# Patient Record
Sex: FEMALE | Race: WHITE | URBAN - METROPOLITAN AREA
[De-identification: names, ages, dates, MRNs, and addresses within clinical notes are randomized per-mention and may not be internally consistent; named-entity substitution may affect disease eponyms.]

---

## 2017-01-14 LAB
ALBUMIN SERPL-MCNC: 4.6 G/DL (ref 3.5–5.5)
ALBUMIN/GLOB SERPL: 1.9 {RATIO} (ref 1.1–2.5)
ALP SERPL-CCNC: 76 IU/L (ref 39–117)
ALT SERPL-CCNC: 14 IU/L (ref 0–32)
AST SERPL-CCNC: 19 IU/L (ref 0–40)
BILIRUB SERPL-MCNC: 0.3 MG/DL (ref 0–1.2)
BUN SERPL-MCNC: 21 MG/DL (ref 6–24)
BUN/CREAT SERPL: 24 (ref 9–23)
CALCIUM SERPL-MCNC: 9.5 MG/DL (ref 8.7–10.2)
CHLORIDE SERPL-SCNC: 101 MMOL/L (ref 96–106)
CHOLEST SERPL-MCNC: 266 MG/DL (ref 100–199)
CO2 SERPL-SCNC: 23 MMOL/L (ref 18–29)
CREAT SERPL-MCNC: 0.86 MG/DL (ref 0.57–1)
EST. AVERAGE GLUCOSE BLD GHB EST-MCNC: 128 MG/DL
GLOBULIN SER CALC-MCNC: 2.4 G/DL (ref 1.5–4.5)
GLUCOSE SERPL-MCNC: 85 MG/DL (ref 65–99)
HBA1C MFR BLD: 6.1 % (ref 4.8–5.6)
HDLC SERPL-MCNC: 62 MG/DL
LDLC SERPL CALC-MCNC: 172 MG/DL (ref 0–99)
POTASSIUM SERPL-SCNC: 5 MMOL/L (ref 3.5–5.2)
PROT SERPL-MCNC: 7 G/DL (ref 6–8.5)
SODIUM SERPL-SCNC: 144 MMOL/L (ref 134–144)
TRIGL SERPL-MCNC: 160 MG/DL (ref 0–149)
VLDLC SERPL CALC-MCNC: 32 MG/DL (ref 5–40)

## 2017-01-17 ENCOUNTER — OFFICE VISIT (OUTPATIENT)
Dept: INTERNAL MEDICINE CLINIC | Age: 62
End: 2017-01-17

## 2017-01-17 VITALS
TEMPERATURE: 97.9 F | SYSTOLIC BLOOD PRESSURE: 100 MMHG | DIASTOLIC BLOOD PRESSURE: 58 MMHG | HEART RATE: 74 BPM | OXYGEN SATURATION: 97 % | RESPIRATION RATE: 16 BRPM | WEIGHT: 171.8 LBS | HEIGHT: 66 IN | BODY MASS INDEX: 27.61 KG/M2

## 2017-01-17 DIAGNOSIS — R73.03 PREDIABETES: ICD-10-CM

## 2017-01-17 DIAGNOSIS — G47.09 OTHER INSOMNIA: Primary | ICD-10-CM

## 2017-01-17 DIAGNOSIS — E78.49 OTHER HYPERLIPIDEMIA: ICD-10-CM

## 2017-01-17 DIAGNOSIS — F32.9 REACTIVE DEPRESSION: ICD-10-CM

## 2017-01-17 DIAGNOSIS — E03.9 ACQUIRED HYPOTHYROIDISM: ICD-10-CM

## 2017-01-17 RX ORDER — TRAZODONE HYDROCHLORIDE 50 MG/1
TABLET ORAL
Qty: 30 TAB | Refills: 1 | Status: SHIPPED | OUTPATIENT
Start: 2017-01-17 | End: 2017-08-15

## 2017-01-17 RX ORDER — LEVOFLOXACIN 500 MG/1
TABLET, FILM COATED ORAL
Refills: 0 | COMMUNITY
Start: 2017-01-06 | End: 2017-08-15

## 2017-01-17 NOTE — PATIENT INSTRUCTIONS
It was a pleasure to see you! As discussed:    Lab review  -Your cholesterol has improved. -Your vitamin Dis  low--> I order high dose vitamin D which you will take weekly for 8 weeks. After 8 weeks start an over the counter vitamin D supplement 800-1000IU/ day. -Your cholesterol is elevated but fortunately based on your risk factors a statin is not indicated. Continue to work on optimizing your weight (goal lose at least 5% body weight), healthy eating and cardiovascular disease (Recommendation: reduce carbs to 150-200g/ day and the Mediterranean Diet). The remainder of your labs were normal. Some labs that may have been tested and their explanation are:  Your electrolytes, kidney & liver function (Metabolic Panel)   Anemia, blood cells (CBC)  Thyroid (TSH + T4, T3)  Hormones (prolactin, vitamin D )   Diabetes (Hemoglobin A1c)          Recovering From Depression: Care Instructions  Your Care Instructions  Taking good care of yourself is important as you recover from depression. In time, your symptoms will fade as your treatment takes hold. Do not give up. Instead, focus your energy on getting better. Your mood will improve. It just takes some time. Focus on things that can help you feel better, such as being with friends and family, eating well, and getting enough rest. But take things slowly. Do not do too much too soon. You will begin to feel better gradually. Follow-up care is a key part of your treatment and safety. Be sure to make and go to all appointments, and call your doctor if you are having problems. It's also a good idea to know your test results and keep a list of the medicines you take. How can you care for yourself at home? Be realistic  · If you have a large task to do, break it up into smaller steps you can handle, and just do what you can. · You may want to put off important decisions until your depression has lifted.  If you have plans that will have a major impact on your life, such as marriage, divorce, or a job change, try to wait a bit. Talk it over with friends and loved ones who can help you look at the overall picture first.  · Reaching out to people for help is important. Do not isolate yourself. Let your family and friends help you. Find someone you can trust and confide in, and talk to that person. · Be patient, and be kind to yourself. Remember that depression is not your fault and is not something you can overcome with willpower alone. Treatment is necessary for depression, just like for any other illness. Feeling better takes time, and your mood will improve little by little. Stay active  · Stay busy and get outside. Take a walk, or try some other light exercise. · Talk with your doctor about an exercise program. Exercise can help with mild depression. · Go to a movie or concert. Take part in a Jainism activity or other social gathering. Go to a ball game. · Ask a friend to have dinner with you. Take care of yourself  · Eat a balanced diet with plenty of fresh fruits and vegetables, whole grains, and lean protein. If you have lost your appetite, eat small snacks rather than large meals. · Avoid drinking alcohol or using illegal drugs. Do not take medicines that have not been prescribed for you. They may interfere with medicines you may be taking for depression, or they may make your depression worse. · Take your medicines exactly as they are prescribed. You may start to feel better within 1 to 3 weeks of taking antidepressant medicine. But it can take as many as 6 to 8 weeks to see more improvement. If you have questions or concerns about your medicines, or if you do not notice any improvement by 3 weeks, talk to your doctor. · If you have any side effects from your medicine, tell your doctor. Antidepressants can make you feel tired, dizzy, or nervous. Some people have dry mouth, constipation, headaches, sexual problems, or diarrhea.  Many of these side effects are mild and will go away on their own after you have been taking the medicine for a few weeks. Some may last longer. Talk to your doctor if side effects are bothering you too much. You might be able to try a different medicine. · Get enough sleep. If you have problems sleeping:  ¨ Go to bed at the same time every night, and get up at the same time every morning. ¨ Keep your bedroom dark and quiet. ¨ Do not exercise after 5:00 p.m. ¨ Avoid drinks with caffeine after 5:00 p.m. · Avoid sleeping pills unless they are prescribed by the doctor treating your depression. Sleeping pills may make you groggy during the day, and they may interact with other medicine you are taking. · If you have any other illnesses, such as diabetes, heart disease, or high blood pressure, make sure to continue with your treatment. Tell your doctor about all of the medicines you take, including those with or without a prescription. · Keep the numbers for these national suicide hotlines: 3-424-200-TALK (5-713.382.2493) and 3-962-PKQUIBQ (8-993.530.2130). If you or someone you know talks about suicide or feeling hopeless, get help right away. When should you call for help? Call 911 anytime you think you may need emergency care. For example, call if:  · You feel like hurting yourself or someone else. · Someone you know has depression and is about to attempt or is attempting suicide. Call your doctor now or seek immediate medical care if:  · You hear voices. · Someone you know has depression and:  ¨ Starts to give away his or her possessions. ¨ Uses illegal drugs or drinks alcohol heavily. ¨ Talks or writes about death, including writing suicide notes or talking about guns, knives, or pills. ¨ Starts to spend a lot of time alone. ¨ Acts very aggressively or suddenly appears calm. Watch closely for changes in your health, and be sure to contact your doctor if:  · You do not get better as expected. Where can you learn more?   Go to http://bello-alejandro.info/. Enter T779 in the search box to learn more about \"Recovering From Depression: Care Instructions. \"  Current as of: July 26, 2016  Content Version: 11.1  © 2070-0081 [employer]. Care instructions adapted under license by [employer] (which disclaims liability or warranty for this information). If you have questions about a medical condition or this instruction, always ask your healthcare professional. [address] any warranty or liability for your use of this information. Grief (Actual/Anticipated): Care Instructions  Your Care Instructions  Grief is your emotional reaction to a major loss. The words \"sorrow\" and \"heartache\" often are used to describe feelings of grief. You feel grief when you lose a beloved person, pet, place, or thing. It is also natural to feel grief when you lose a valued way of life, such as a job, marriage, or good health. You may begin to grieve before a loss occurs. You may grieve for a loved one who is sick and dying. Children and adults often feel the pain of loss before a big move or divorce. This type of grief helps you get ready for a loss. Grief is different for each person. There is no \"normal\" or \"expected\" period of time for grieving. Some people adjust to their loss within a couple of months. Others may take 2 years or longer, especially if their lives were changed a lot or if the loss was sudden and shocking. Grieving can cause problems such as headaches, loss of appetite, and trouble with thinking or sleeping. You may withdraw from friends and family and behave in ways that are unusual for you. Grief may cause you to question your beliefs and views about life. Grief is natural and does not require medical treatment. But if you have trouble sleeping, it may help to take sleeping pills for a short time.  It may help to talk with people who have been through or are going through similar losses. You may also want to talk to a counselor about your feelings. Talking about your loss, sharing your cares and concerns, and getting support from others are important parts of healthy grieving. Follow-up care is a key part of your treatment and safety. Be sure to make and go to all appointments, and call your doctor if you are having problems. Its also a good idea to know your test results and keep a list of the medicines you take. How can you care for yourself at home? · Get enough sleep. Your mind helps make sense of your life while you sleep. Missing sleep can lead to illness and make it harder for you to deal with your grief. · Eat healthy foods. Try to avoid eating only foods that give you comfort. Ask someone to join you for a meal if you do not like eating alone. Consider taking a multivitamin every day. · Get some exercise every day. Even a walk can help you deal with your grief. Other exercises, such as yoga, can also help you manage stress. · Comfort yourself. Take time to look at photos or use special items that make you feel better. · Stay involved in your life. Do not withdraw from the activities you enjoy. People you know at work, Evangelical, clubs, or other groups can help you get through your period of grief. · Think about joining a support group to help you deal with your grief. There are many support groups to help people recover from grief. When should you call for help? Be sure to contact your doctor if:  · You feel that life is meaningless, or you think about killing yourself. · A grieving person you know talks about hurting himself or herself. · You have any of the following problems that last for 2 or more weeks:  ¨ You feel sad a lot or cry all the time. ¨ You have trouble sleeping, or you sleep too much. ¨ You find it hard to concentrate, make decisions, or remember things. ¨ You change how you normally eat.   ¨ You feel guilty about the death or loss you have suffered. ¨ You are using alcohol or drugs to help you cope with your loss. Where can you learn more? Go to http://bello-alejandro.info/. Enter H249 in the search box to learn more about \"Grief (Actual/Anticipated): Care Instructions. \"  Current as of: February 24, 2016  Content Version: 11.1  © 7273-9441 [employer]. Care instructions adapted under license by [employer] (which disclaims liability or warranty for this information). If you have questions about a medical condition or this instruction, always ask your healthcare professional. [place] [address] any warranty or liability for your use of this information.

## 2017-01-17 NOTE — PROGRESS NOTES
Discussed 01/17/17   Lab review  -Your cholesterol has improved. -Your vitamin Dis  low--> I order high dose vitamin D which you will take weekly for 8 weeks. After 8 weeks start an over the counter vitamin D supplement 800-1000IU/ day. -Your cholesterol is elevated but fortunately based on your risk factors a statin is not indicated. Continue to work on optimizing your weight (goal lose at least 5% body weight), healthy eating and cardiovascular disease (Recommendation: reduce carbs to 150-200g/ day and the Mediterranean Diet).   The remainder of your labs were normal. Some labs that may have been tested and their explanation are:  Your electrolytes, kidney & liver function (Metabolic Panel)   Anemia, blood cells (CBC)  Thyroid (TSH + T4, T3)  Hormones (prolactin, vitamin D )   Diabetes (Hemoglobin A1c)

## 2017-01-17 NOTE — MR AVS SNAPSHOT
Visit Information Date & Time Provider Department Dept. Phone Encounter #  
 1/17/2017 11:30 AM Vivien Rossi MD [address] [address] Internal Medicine [phone] [id number] Follow-up Instructions Return in about 2 months (around 3/17/2017) for Follow-up depression/ insomnia . Upcoming Health Maintenance Date Due FOBT Q 1 YEAR AGE 50-75 11/30/2007 INFLUENZA AGE 9 TO ADULT 8/1/2016 BREAST CANCER SCRN MAMMOGRAM 5/12/2018 PAP AKA CERVICAL CYTOLOGY 5/1/2019 DTaP/Tdap/Td series (2 - Td) 5/27/2022 Allergies as of 1/17/2017  Review Complete On: 1/17/2017 By: Vivien Rossi MD  
  
 Severity Noted Reaction Type Reactions Codeine  11/24/2014   Not Verified Unknown (comments) Sudafed [Pseudoephedrine Hcl]  11/24/2014   Side Effect Palpitations Sulfa (Sulfonamide Antibiotics)  11/24/2014   Systemic Itching Current Immunizations  Never Reviewed No immunizations on file. Not reviewed this visit You Were Diagnosed With   
  
 Codes Comments Other insomnia    -  Primary ICD-10-CM: G47.09 
ICD-9-CM: 780.52 Reactive depression     ICD-10-CM: F32.9 ICD-9-CM: 300.4 Other hyperlipidemia     ICD-10-CM: E78.4 ICD-9-CM: 272.4 Acquired hypothyroidism     ICD-10-CM: E03.9 ICD-9-CM: 244.9 Prediabetes     ICD-10-CM: R73.03 
ICD-9-CM: 790.29 Vitals BP Pulse Temp Resp Height(growth percentile) Weight(growth percentile) 100/58 (BP 1 Location: Right arm, BP Patient Position: Sitting) 74 97.9 °F (36.6 °C) (Oral) 16 5' 6\" (1.676 m) 171 lb 12.8 oz (77.9 kg) SpO2 BMI OB Status Smoking Status 97% 27.73 kg/m2 Unknown Never Smoker Vitals History BMI and BSA Data Body Mass Index Body Surface Area  
 27.73 kg/m 2 1.9 m 2 Preferred Pharmacy Pharmacy Name Phone [facility]/PHARMACY #[id number] - [place] - [address] [address].. [phone] Your Updated Medication List  
  
   
 This list is accurate as of: 1/17/17 12:16 PM.  Always use your most recent med list.  
  
  
  
  
 ASTEPRO 0.15 % (205.5 mcg) nasal spray Generic drug:  Azelastine  
two (2) times a day. levoFLOXacin 500 mg tablet Commonly known as:  LEVAQUIN  
TAKE 1 TABLET BY MOUTH EVERY DAY FOR 10 DAYS  
  
 NASONEX 50 mcg/actuation nasal spray Generic drug:  mometasone 2 sprays daily. PROzac 40 mg capsule Generic drug:  FLUoxetine Take 40 mg by mouth daily. SYNTHROID 88 mcg tablet Generic drug:  levothyroxine Take  by mouth Daily (before breakfast). traZODone 50 mg tablet Commonly known as:  Deborah Luis Take 1 tablet by mouth 2 hours before bedtime VAGIFEM 10 mcg Tab vaginal tablet Generic drug:  estradiol Insert 10 mcg into vagina daily. Prescriptions Sent to Pharmacy Refills  
 traZODone (DESYREL) 50 mg tablet 1 Sig: Take 1 tablet by mouth 2 hours before bedtime Class: Normal  
 Pharmacy: [facility]/pharmacy #[id number] - [place], [place] - [address] Ph #: [phone] Follow-up Instructions Return in about 2 months (around 3/17/2017) for Follow-up depression/ insomnia . Patient Instructions It was a pleasure to see you! As discussed: 
 
Lab review 
-Your cholesterol has improved. -Your vitamin Dis  low--> I order high dose vitamin D which you will take weekly for 8 weeks. After 8 weeks start an over the counter vitamin D supplement 800-1000IU/ day. -Your cholesterol is elevated but fortunately based on your risk factors a statin is not indicated. Continue to work on optimizing your weight (goal lose at least 5% body weight), healthy eating and cardiovascular disease (Recommendation: reduce carbs to 150-200g/ day and the Mediterranean Diet). The remainder of your labs were normal. Some labs that may have been tested and their explanation are: 
Your electrolytes, kidney & liver function (Metabolic Panel) Anemia, blood cells (CBC) Thyroid (TSH + T4, T3) Hormones (prolactin, vitamin D ) Diabetes (Hemoglobin A1c) Recovering From Depression: Care Instructions Your Care Instructions Taking good care of yourself is important as you recover from depression. In time, your symptoms will fade as your treatment takes hold. Do not give up. Instead, focus your energy on getting better. Your mood will improve. It just takes some time. Focus on things that can help you feel better, such as being with friends and family, eating well, and getting enough rest. But take things slowly. Do not do too much too soon. You will begin to feel better gradually. Follow-up care is a key part of your treatment and safety. Be sure to make and go to all appointments, and call your doctor if you are having problems. It's also a good idea to know your test results and keep a list of the medicines you take. How can you care for yourself at home? Be realistic · If you have a large task to do, break it up into smaller steps you can handle, and just do what you can. · You may want to put off important decisions until your depression has lifted. If you have plans that will have a major impact on your life, such as marriage, divorce, or a job change, try to wait a bit. Talk it over with friends and loved ones who can help you look at the overall picture first. 
· Reaching out to people for help is important. Do not isolate yourself. Let your family and friends help you. Find someone you can trust and confide in, and talk to that person. · Be patient, and be kind to yourself. Remember that depression is not your fault and is not something you can overcome with willpower alone. Treatment is necessary for depression, just like for any other illness. Feeling better takes time, and your mood will improve little by little. Stay active · Stay busy and get outside. Take a walk, or try some other light exercise. · Talk with your doctor about an exercise program. Exercise can help with mild depression. · Go to a movie or concert. Take part in a Anabaptist activity or other social gathering. Go to a ball game. · Ask a friend to have dinner with you. Take care of yourself · Eat a balanced diet with plenty of fresh fruits and vegetables, whole grains, and lean protein. If you have lost your appetite, eat small snacks rather than large meals. · Avoid drinking alcohol or using illegal drugs. Do not take medicines that have not been prescribed for you. They may interfere with medicines you may be taking for depression, or they may make your depression worse. · Take your medicines exactly as they are prescribed. You may start to feel better within 1 to 3 weeks of taking antidepressant medicine. But it can take as many as 6 to 8 weeks to see more improvement. If you have questions or concerns about your medicines, or if you do not notice any improvement by 3 weeks, talk to your doctor. · If you have any side effects from your medicine, tell your doctor. Antidepressants can make you feel tired, dizzy, or nervous. Some people have dry mouth, constipation, headaches, sexual problems, or diarrhea. Many of these side effects are mild and will go away on their own after you have been taking the medicine for a few weeks. Some may last longer. Talk to your doctor if side effects are bothering you too much. You might be able to try a different medicine. · Get enough sleep. If you have problems sleeping: ¨ Go to bed at the same time every night, and get up at the same time every morning. ¨ Keep your bedroom dark and quiet. ¨ Do not exercise after 5:00 p.m. ¨ Avoid drinks with caffeine after 5:00 p.m. · Avoid sleeping pills unless they are prescribed by the doctor treating your depression. Sleeping pills may make you groggy during the day, and they may interact with other medicine you are taking. · If you have any other illnesses, such as diabetes, heart disease, or high blood pressure, make sure to continue with your treatment. Tell your doctor about all of the medicines you take, including those with or without a prescription. · Keep the numbers for these national suicide hotlines: 3-773-238-TALK (2-787.832.7504) and 9-539-PKDQVBN (6-789.733.6350). If you or someone you know talks about suicide or feeling hopeless, get help right away. When should you call for help? Call 911 anytime you think you may need emergency care. For example, call if: 
· You feel like hurting yourself or someone else. · Someone you know has depression and is about to attempt or is attempting suicide. Call your doctor now or seek immediate medical care if: 
· You hear voices. · Someone you know has depression and: 
¨ Starts to give away his or her possessions. ¨ Uses illegal drugs or drinks alcohol heavily. ¨ Talks or writes about death, including writing suicide notes or talking about guns, knives, or pills. ¨ Starts to spend a lot of time alone. ¨ Acts very aggressively or suddenly appears calm. Watch closely for changes in your health, and be sure to contact your doctor if: 
· You do not get better as expected. Where can you learn more? Go to http://bello-alejandro.info/. Enter P542 in the search box to learn more about \"Recovering From Depression: Care Instructions. \" Current as of: July 26, 2016 Content Version: 11.1 © 8778-6772 "[employer] Incorporated. Care instructions adapted under license by [employer] (which disclaims liability or warranty for this information). If you have questions about a medical condition or this instruction, always ask your healthcare professional. [address] any warranty or liability for your use of this information. Grief (Actual/Anticipated): Care Instructions Your Care Instructions Grief is your emotional reaction to a major loss. The words \"sorrow\" and \"heartache\" often are used to describe feelings of grief. You feel grief when you lose a beloved person, pet, place, or thing. It is also natural to feel grief when you lose a valued way of life, such as a job, marriage, or good health. You may begin to grieve before a loss occurs. You may grieve for a loved one who is sick and dying. Children and adults often feel the pain of loss before a big move or divorce. This type of grief helps you get ready for a loss. Grief is different for each person. There is no \"normal\" or \"expected\" period of time for grieving. Some people adjust to their loss within a couple of months. Others may take 2 years or longer, especially if their lives were changed a lot or if the loss was sudden and shocking. Grieving can cause problems such as headaches, loss of appetite, and trouble with thinking or sleeping. You may withdraw from friends and family and behave in ways that are unusual for you. Grief may cause you to question your beliefs and views about life. Grief is natural and does not require medical treatment. But if you have trouble sleeping, it may help to take sleeping pills for a short time. It may help to talk with people who have been through or are going through similar losses. You may also want to talk to a counselor about your feelings. Talking about your loss, sharing your cares and concerns, and getting support from others are important parts of healthy grieving. Follow-up care is a key part of your treatment and safety. Be sure to make and go to all appointments, and call your doctor if you are having problems. Its also a good idea to know your test results and keep a list of the medicines you take. How can you care for yourself at home? · Get enough sleep. Your mind helps make sense of your life while you sleep.  Missing sleep can lead to illness and make it harder for you to deal with your grief. · Eat healthy foods. Try to avoid eating only foods that give you comfort. Ask someone to join you for a meal if you do not like eating alone. Consider taking a multivitamin every day. · Get some exercise every day. Even a walk can help you deal with your grief. Other exercises, such as yoga, can also help you manage stress. · Comfort yourself. Take time to look at photos or use special items that make you feel better. · Stay involved in your life. Do not withdraw from the activities you enjoy. People you know at work, Oriental orthodox, clubs, or other groups can help you get through your period of grief. · Think about joining a support group to help you deal with your grief. There are many support groups to help people recover from grief. When should you call for help? Be sure to contact your doctor if: 
· You feel that life is meaningless, or you think about killing yourself. · A grieving person you know talks about hurting himself or herself. · You have any of the following problems that last for 2 or more weeks: 
¨ You feel sad a lot or cry all the time. ¨ You have trouble sleeping, or you sleep too much. ¨ You find it hard to concentrate, make decisions, or remember things. ¨ You change how you normally eat. ¨ You feel guilty about the death or loss you have suffered. ¨ You are using alcohol or drugs to help you cope with your loss. Where can you learn more? Go to http://bello-alejandro.info/. Enter H249 in the search box to learn more about \"Grief (Actual/Anticipated): Care Instructions. \" Current as of: February 24, 2016 Content Version: 11.1 © 6193-5029 [employer]. Care instructions adapted under license by [employer] (which disclaims liability or warranty for this information).  If you have questions about a medical condition or this instruction, always ask your healthcare professional. [employer] disclaims any warranty or liability for your use of this information. Introducing [facility]! Radha Estevez introduces YOGITECH patient portal. Now you can access parts of your medical record, email your doctor's office, and request medication refills online. 1. In your internet browser, go to [email or web address] 2. Click on the First Time User? Click Here link in the Sign In box. You will see the New Member Sign Up page. 3. Enter your YOGITECH Access Code exactly as it appears below. You will not need to use this code after youve completed the sign-up process. If you do not sign up before the expiration date, you must request a new code. · YOGITECH Access Code: [email or web address] Expires: 4/17/2017 12:16 PM 
 
4. Enter the last four digits of your Social Security Number (xxxx) and Date of Birth (mm/dd/yyyy) as indicated and click Submit. You will be taken to the next sign-up page. 5. Create a YOGITECH ID. This will be your YOGITECH login ID and cannot be changed, so think of one that is secure and easy to remember. 6. Create a YOGITECH password. You can change your password at any time. 7. Enter your Password Reset Question and Answer. This can be used at a later time if you forget your password. 8. Enter your e-mail address. You will receive e-mail notification when new information is available in [address] 9. Click Sign Up. You can now view and download portions of your medical record. 10. Click the Download Summary menu link to download a portable copy of your medical information. If you have questions, please visit the Frequently Asked Questions section of the YOGITECH website. Remember, YOGITECH is NOT to be used for urgent needs. For medical emergencies, dial 911. Now available from your iPhone and Android! Please provide this summary of care documentation to your next provider. Your primary care clinician is listed as [facility]. If you have any questions after today's visit, please call [phone].

## 2017-01-17 NOTE — PROGRESS NOTES
HISTORY OF PRESENT ILLNESS  Steven Montana is a 63 y.o. female. HPI  Sinusitis   Did not improved after Amoxil. Completed Levaquin- prescribed by Dr. Radha Ochoa ENT. 1/6/17-1/13/17   Her symptoms have improved. She still has some fatigue. Depression  Patient is seen for followup of depression. Treatment includes Prozac- missed 1 week of treatment 12/29-1/7/17 and seeing therapist, Giulia Dunbar (counselor at [facility]) discussing grief  she denies suicidal thoughts with specific plan. she experiences the following side effects from the treatment: none. PHQ 2 / 9, over the last two weeks 10/19/2016   Little interest or pleasure in doing things Not at all   Feeling down, depressed or hopeless Not at all   Total Score PHQ 2 0       SHx: Father is in hospice in [place]. She is on school board. Review of Systems   Constitutional: Negative for diaphoresis, fever and weight loss. Eyes: Negative for blurred vision and pain. Respiratory: Negative for shortness of breath. Cardiovascular: Negative for chest pain, orthopnea and leg swelling. Neurological: Negative for focal weakness and headaches. Psychiatric/Behavioral: Positive for depression. The patient is nervous/anxious and has insomnia. Patient Active Problem List    Diagnosis Date Noted    HLD (hyperlipidemia) 06/15/2016    Acquired hypothyroidism 06/15/2016    Reactive depression 06/15/2016    Sinus problem        Current Outpatient Prescriptions   Medication Sig Dispense Refill    traZODone (DESYREL) 50 mg tablet Take 1 tablet by mouth 2 hours before bedtime 30 Tab 1    levothyroxine (SYNTHROID) 88 mcg tablet Take  by mouth Daily (before breakfast).  FLUoxetine (PROZAC) 40 mg capsule Take 40 mg by mouth daily.  mometasone (NASONEX) 50 mcg/actuation nasal spray 2 sprays daily.  Azelastine (ASTEPRO) 0.15 % (205.5 mcg) nasal spray two (2) times a day.       ergocalciferol (ERGOCALCIFEROL) 50,000 unit capsule Take 1 Cap by mouth every seven (7) days. 8 Cap 0    levoFLOXacin (LEVAQUIN) 500 mg tablet TAKE 1 TABLET BY MOUTH EVERY DAY FOR 10 DAYS  0    estradiol (VAGIFEM) 10 mcg tab vaginal tablet Insert 10 mcg into vagina daily. Allergies   Allergen Reactions    Codeine Unknown (comments)    Sudafed [Pseudoephedrine Hcl] Palpitations    Sulfa (Sulfonamide Antibiotics) Itching      Visit Vitals    /58 (BP 1 Location: Right arm, BP Patient Position: Sitting)    Pulse 74    Temp 97.9 °F (36.6 °C) (Oral)    Resp 16    Ht 5' 6\" (1.676 m)    Wt 171 lb 12.8 oz (77.9 kg)    SpO2 97%    BMI 27.73 kg/m2       Physical Exam   Constitutional: She is oriented to person, place, and time. No distress. Cardiovascular: Normal rate, regular rhythm and normal heart sounds. Pulmonary/Chest: Breath sounds normal. No respiratory distress. She has no wheezes. She has no rales. Neurological: She is alert and oriented to person, place, and time. ASSESSMENT and PLAN  Melonie Leiva was seen today for follow-up. Diagnoses and all orders for this visit:    Other insomnia  -     traZODone (DESYREL) 50 mg tablet; Take 1 tablet by mouth 2 hours before bedtime    Reactive depression  -     traZODone (DESYREL) 50 mg tablet; Take 1 tablet by mouth 2 hours before bedtime    Other hyperlipidemia    Acquired hypothyroidism    Prediabetes      Follow-up Disposition:  Return in about 2 months (around 3/17/2017) for Follow-up depression/ insomnia . Medication risks/benefits/costs/interactions/alternatives discussed with patient. Natalyacampbell Wheeler  was given an after visit summary which includes diagnoses, current medications, & vitals. she expressed understanding with the diagnosis and plan.

## 2017-01-17 NOTE — LETTER
1/17/2017 12:16 PM 
 
Ms. Hayden Schmid [address] [facility],[address] [place] [address] [phone] Dear Hayden Schmid It was a pleasure to see you during your recent visit. Thank you for completing your labs. Please find your most recent results below. Your results show: 
Lab review 
-Your cholesterol has improved. -Your vitamin Dis  low--> I order high dose vitamin D which you will take weekly for 8 weeks. After 8 weeks start an over the counter vitamin D supplement 800-1000IU/ day. -Your cholesterol is elevated but fortunately based on your risk factors a statin is not indicated. Continue to work on optimizing your weight (goal lose at least 5% body weight), healthy eating and cardiovascular disease (Recommendation: reduce carbs to 150-200g/ day and the Mediterranean Diet). The remainder of your labs were normal. Some labs that may have been tested and their explanation are: 
Your electrolytes, kidney & liver function (Metabolic Panel) Anemia, blood cells (CBC) Thyroid (TSH + T4, T3) Hormones (prolactin, vitamin D ) Diabetes (Hemoglobin A1c) Remember to sign up for [employer] so we can communicate via email and you can view your records on line. Do not hesitate to contact the office if you have any questions or concerns before your next appointment. Kind regards,  
 
 
---- Reshma Ny MD 
Internal Medicine/ [facility] [address] [facility], [address] Office: [phone] Fax: [phone] Resulted Orders METABOLIC PANEL, COMPREHENSIVE Result Value Ref Range Glucose 85 65 - 99 mg/dL BUN 21 6 - 24 mg/dL Creatinine 0.86 0.57 - 1.00 mg/dL GFR est non-AA 74 >59 mL/min/1.73 GFR est AA 86 >59 mL/min/1.73  
 BUN/Creatinine ratio 24 (H) 9 - 23 Sodium 144 134 - 144 mmol/L Potassium 5.0 3.5 - 5.2 mmol/L Chloride 101 96 - 106 mmol/L  
 CO2 23 18 - 29 mmol/L Calcium 9.5 8.7 - 10.2 mg/dL Protein, total 7.0 6.0 - 8.5 g/dL Albumin 4.6 3.5 - 5.5 g/dL GLOBULIN, TOTAL 2.4 1.5 - 4.5 g/dL A-G Ratio 1.9 1.1 - 2.5 Bilirubin, total 0.3 0.0 - 1.2 mg/dL Alk. phosphatase 76 39 - 117 IU/L  
 AST 19 0 - 40 IU/L  
 ALT 14 0 - 32 IU/L Narrative Performed at:  [address]  [id number] : Millicent Matthews MD, Phone:  [phone] LIPID PANEL Result Value Ref Range Cholesterol, total 266 (H) 100 - 199 mg/dL Triglyceride 160 (H) 0 - 149 mg/dL HDL Cholesterol 62 >39 mg/dL VLDL, calculated 32 5 - 40 mg/dL LDL, calculated 172 (H) 0 - 99 mg/dL Narrative Performed at:  [address]  [id number] : Millicent Matthews MD, Phone:  [phone] HEMOGLOBIN A1C WITH EAG Result Value Ref Range Hemoglobin A1c 6.1 (H) 4.8 - 5.6 % Comment:  
            Pre-diabetes: 5.7 - 6.4 Diabetes: >6.4 Glycemic control for adults with diabetes: <7.0 Estimated average glucose 128 mg/dL Narrative Performed at:  [address]  [id number] : Millicent Matthews MD, Phone:  [phone]

## 2017-01-18 ENCOUNTER — TELEPHONE (OUTPATIENT)
Dept: INTERNAL MEDICINE CLINIC | Age: 62
End: 2017-01-18

## 2017-01-18 RX ORDER — ERGOCALCIFEROL 1.25 MG/1
50000 CAPSULE ORAL
Qty: 8 CAP | Refills: 0 | Status: SHIPPED | OUTPATIENT
Start: 2017-01-18 | End: 2017-08-15

## 2017-01-18 RX ORDER — ERGOCALCIFEROL 1.25 MG/1
50000 CAPSULE ORAL
Qty: 8 CAP | Refills: 0 | Status: CANCELLED | OUTPATIENT
Start: 2017-01-18

## 2017-01-18 NOTE — TELEPHONE ENCOUNTER
Pt said the medication Trazodone is making her to sleepy can you call something else?  Also  She did not get a script for the  Vitamin D

## 2017-01-19 DIAGNOSIS — R73.09 ELEVATED HEMOGLOBIN A1C: ICD-10-CM

## 2017-01-19 DIAGNOSIS — E78.49 OTHER HYPERLIPIDEMIA: ICD-10-CM

## 2017-01-19 NOTE — TELEPHONE ENCOUNTER
Left message for pt to return call regarding Trazadone rx  Recommendations, vit D sent in  To pt pharmacy

## 2017-01-20 ENCOUNTER — TELEPHONE (OUTPATIENT)
Dept: INTERNAL MEDICINE CLINIC | Age: 62
End: 2017-01-20

## 2017-01-20 DIAGNOSIS — F32.9 REACTIVE DEPRESSION: Primary | ICD-10-CM

## 2017-01-20 DIAGNOSIS — E03.9 ACQUIRED HYPOTHYROIDISM: ICD-10-CM

## 2017-01-20 NOTE — TELEPHONE ENCOUNTER
Patient states Trazadone is too strong for her and would prefer something else for sleep for a short period as her father is currently in hospice. She is ok with Melatonin or any other prescribed sleep aide.

## 2017-01-26 LAB — 25(OH)D3+25(OH)D2 SERPL-MCNC: 27.4 NG/ML (ref 30–100)

## 2017-01-27 NOTE — PROGRESS NOTES
Please let Seth Tavarez know her vitamin is low but she DOES NOT need the high dose vitamin D. She should take 600-800IU vitamin D by mouth daily  Sorry for the confusion. I will send her a corrected letter.

## 2017-01-27 NOTE — PROGRESS NOTES
Please let Cherelle General know her vitamin is low but she DOES NOT need the high dose vitamin D.    She should take 600-800IU vitamin D by mouth daily

## 2017-04-26 RX ORDER — LEVOTHYROXINE SODIUM 88 UG/1
88 TABLET ORAL
Qty: 30 TAB | Refills: 4 | Status: SHIPPED | OUTPATIENT
Start: 2017-04-26 | End: 2017-05-25 | Stop reason: SDUPTHER

## 2017-05-08 ENCOUNTER — TELEPHONE (OUTPATIENT)
Dept: INTERNAL MEDICINE CLINIC | Age: 62
End: 2017-05-08

## 2017-05-08 DIAGNOSIS — F32.9 REACTIVE DEPRESSION: Primary | ICD-10-CM

## 2017-05-08 RX ORDER — FLUOXETINE HYDROCHLORIDE 40 MG/1
40 CAPSULE ORAL DAILY
Qty: 90 CAP | Refills: 0 | Status: SHIPPED | OUTPATIENT
Start: 2017-05-08 | End: 2017-05-25 | Stop reason: SDUPTHER

## 2017-05-08 NOTE — TELEPHONE ENCOUNTER
[phone] refill on paxil to [facility], pt says that it is cheaper for her if she gets this med for 3 mos at a time.

## 2017-05-25 ENCOUNTER — DOCUMENTATION ONLY (OUTPATIENT)
Dept: INTERNAL MEDICINE CLINIC | Age: 62
End: 2017-05-25

## 2017-05-25 ENCOUNTER — OFFICE VISIT (OUTPATIENT)
Dept: INTERNAL MEDICINE CLINIC | Age: 62
End: 2017-05-25

## 2017-05-25 VITALS
TEMPERATURE: 98 F | RESPIRATION RATE: 16 BRPM | WEIGHT: 172 LBS | HEART RATE: 69 BPM | SYSTOLIC BLOOD PRESSURE: 98 MMHG | BODY MASS INDEX: 27.64 KG/M2 | OXYGEN SATURATION: 97 % | DIASTOLIC BLOOD PRESSURE: 62 MMHG | HEIGHT: 66 IN

## 2017-05-25 DIAGNOSIS — H66.91 OTITIS, RIGHT: ICD-10-CM

## 2017-05-25 DIAGNOSIS — F32.9 REACTIVE DEPRESSION: ICD-10-CM

## 2017-05-25 DIAGNOSIS — E03.9 ACQUIRED HYPOTHYROIDISM: Primary | ICD-10-CM

## 2017-05-25 DIAGNOSIS — F51.04 PSYCHOPHYSIOLOGICAL INSOMNIA: ICD-10-CM

## 2017-05-25 RX ORDER — FLUOXETINE HYDROCHLORIDE 40 MG/1
40 CAPSULE ORAL DAILY
Qty: 90 CAP | Refills: 3 | Status: SHIPPED | OUTPATIENT
Start: 2017-05-25 | End: 2018-04-11 | Stop reason: SDUPTHER

## 2017-05-25 RX ORDER — CIPROFLOXACIN 0.5 MG/.25ML
0.25 SOLUTION/ DROPS AURICULAR (OTIC) EVERY 12 HOURS
Qty: 14 EACH | Refills: 0 | Status: SHIPPED | OUTPATIENT
Start: 2017-05-25 | End: 2017-06-01

## 2017-05-25 RX ORDER — LEVOTHYROXINE SODIUM 88 UG/1
88 TABLET ORAL
Qty: 90 TAB | Refills: 2 | Status: SHIPPED | OUTPATIENT
Start: 2017-05-25 | End: 2018-02-02 | Stop reason: SDUPTHER

## 2017-05-25 NOTE — PROGRESS NOTES
HISTORY OF PRESENT ILLNESS  Nicola Montaño is a 63 y.o. female. HPI  Insomnia  Stable. Is using melatonin which helps. Has not used trazodone. Has seen sleep medicine- referred for customized mouth guard. Ears   Itching recurrent. She denies pain. Depression  Patient is seen for followup of depression. Treatment includes Prozac and no other therapies. Exercises regularly   she denies suicidal thoughts with specific plan. she experiences the following side effects from the treatment: none. PHQ over the last two weeks 10/19/2016   Little interest or pleasure in doing things Not at all   Feeling down, depressed or hopeless Not at all   Total Score PHQ 2 0         Review of Systems   Constitutional: Negative for diaphoresis, fever and weight loss. HENT: Positive for ear discharge and ear pain. Eyes: Negative for blurred vision and pain. Respiratory: Negative for shortness of breath. Cardiovascular: Negative for chest pain, orthopnea and leg swelling. Neurological: Negative for focal weakness and headaches. Psychiatric/Behavioral: Negative for depression. Patient Active Problem List    Diagnosis Date Noted    HLD (hyperlipidemia) 06/15/2016    Acquired hypothyroidism 06/15/2016    Reactive depression 06/15/2016    Sinus problem        Current Outpatient Prescriptions   Medication Sig Dispense Refill    FLUoxetine (PROZAC) 40 mg capsule Take 1 Cap by mouth daily. 90 Cap 0    levothyroxine (SYNTHROID) 88 mcg tablet Take 1 Tab by mouth Daily (before breakfast). (Patient taking differently: Take 88 mcg by mouth Daily (before breakfast). Indications: BRAND) 30 Tab 4    mometasone (NASONEX) 50 mcg/actuation nasal spray 2 sprays daily.  Azelastine (ASTEPRO) 0.15 % (205.5 mcg) nasal spray two (2) times a day.  ergocalciferol (ERGOCALCIFEROL) 50,000 unit capsule Take 1 Cap by mouth every seven (7) days.  8 Cap 0    levoFLOXacin (LEVAQUIN) 500 mg tablet TAKE 1 TABLET BY MOUTH EVERY DAY FOR 10 DAYS  0    traZODone (DESYREL) 50 mg tablet Take 1 tablet by mouth 2 hours before bedtime 30 Tab 1    estradiol (VAGIFEM) 10 mcg tab vaginal tablet Insert 10 mcg into vagina daily. Allergies   Allergen Reactions    Codeine Unknown (comments)    Sudafed [Pseudoephedrine Hcl] Palpitations    Sulfa (Sulfonamide Antibiotics) Itching      Visit Vitals    BP 98/62 (BP 1 Location: Right arm)    Pulse 69    Temp 98 °F (36.7 °C) (Oral)    Resp 16    Ht 5' 6\" (1.676 m)    Wt 172 lb (78 kg)    SpO2 97%    BMI 27.76 kg/m2       Physical Exam   Constitutional: She is oriented to person, place, and time. No distress. HENT:   Right Ear: There is drainage and tenderness. Tympanic membrane is not injected. Left Ear: Tympanic membrane is not injected. Ears:    Cardiovascular: Normal rate, regular rhythm and normal heart sounds. Pulmonary/Chest: Breath sounds normal. No respiratory distress. She has no wheezes. She has no rales. Neurological: She is alert and oriented to person, place, and time. Lab Results  Component Value Date/Time   Cholesterol, total 266 01/13/2017 10:22 AM   HDL Cholesterol 62 01/13/2017 10:22 AM   LDL, calculated 172 01/13/2017 10:22 AM   Triglyceride 160 01/13/2017 10:22 AM       Lab Results  Component Value Date/Time   ALT (SGPT) 14 01/13/2017 10:22 AM   AST (SGOT) 19 01/13/2017 10:22 AM   Alk. phosphatase 76 01/13/2017 10:22 AM   Bilirubin, total 0.3 01/13/2017 10:22 AM       Lab Results  Component Value Date/Time   GFR est AA 86 01/13/2017 10:22 AM   GFR est non-AA 74 01/13/2017 10:22 AM   Creatinine 0.86 01/13/2017 10:22 AM   BUN 21 01/13/2017 10:22 AM   Sodium 144 01/13/2017 10:22 AM   Potassium 5.0 01/13/2017 10:22 AM   Chloride 101 01/13/2017 10:22 AM   CO2 23 01/13/2017 10:22 AM      Lab Results   Component Value Date/Time    Glucose 85 01/13/2017 10:22 AM         ASSESSMENT and PLAN  Joey Ordoñez was seen today for depression.     Diagnoses and all orders for this visit:    Acquired hypothyroidism  -     levothyroxine (SYNTHROID) 88 mcg tablet; Take 1 Tab by mouth Daily (before breakfast). Indications: BRAND    Reactive depression  -     FLUoxetine (PROZAC) 40 mg capsule; Take 1 Cap by mouth daily. Psychophysiological insomnia    Otitis, right- bacterial vs fungal. Hold steroid. recheck in 10-14 days,   -     ciprofloxacin (CETRAXAL) 0.2 % otic solution; Administer 0.25 mL in right ear every twelve (12) hours for 7 days. Follow-up Disposition: 2 weeks ear check     Medication risks/benefits/costs/interactions/alternatives discussed with patient. Johnie Harman  was given an after visit summary which includes diagnoses, current medications, & vitals. she expressed understanding with the diagnosis and plan.

## 2017-05-25 NOTE — MR AVS SNAPSHOT
Visit Information Date & Time Provider Department Dept. Phone Encounter #  
 5/25/2017  9:30 AM Dang Newton MD [facility] Internal Medicine [phone] [id number] Follow-up Instructions Return in about 2 weeks (around 6/8/2017) for Follow-up Otitis externa (right). Upcoming Health Maintenance Date Due FOBT Q 1 YEAR AGE 50-75 11/30/2007 INFLUENZA AGE 9 TO ADULT 8/1/2017 BREAST CANCER SCRN MAMMOGRAM 5/12/2018 PAP AKA CERVICAL CYTOLOGY 5/1/2019 DTaP/Tdap/Td series (2 - Td) 5/27/2022 Allergies as of 5/25/2017  Review Complete On: 5/25/2017 By: Dang Newton MD  
  
 Severity Noted Reaction Type Reactions Codeine  11/24/2014   Not Verified Unknown (comments) Sudafed [Pseudoephedrine Hcl]  11/24/2014   Side Effect Palpitations Sulfa (Sulfonamide Antibiotics)  11/24/2014   Systemic Itching Current Immunizations  Never Reviewed No immunizations on file. Not reviewed this visit You Were Diagnosed With   
  
 Codes Comments Acquired hypothyroidism    -  Primary ICD-10-CM: E03.9 ICD-9-CM: 244.9 Reactive depression     ICD-10-CM: F32.9 ICD-9-CM: 300.4 Psychophysiological insomnia     ICD-10-CM: F51.04 
ICD-9-CM: 307.42 Otitis, right     ICD-10-CM: H66.91 
ICD-9-CM: 382. 9 Vitals BP Pulse Temp Resp Height(growth percentile) Weight(growth percentile) 98/62 (BP 1 Location: Right arm) 69 98 °F (36.7 °C) (Oral) 16 5' 6\" (1.676 m) 172 lb (78 kg) SpO2 BMI OB Status Smoking Status 97% 27.76 kg/m2 Unknown Never Smoker Vitals History BMI and BSA Data Body Mass Index Body Surface Area  
 27.76 kg/m 2 1.91 m 2 Preferred Pharmacy Pharmacy Name Phone [id number] [place] [facility]  [address] [phone] Your Updated Medication List  
  
   
This list is accurate as of: 5/25/17 10:50 AM.  Always use your most recent med list.  
  
  
  
  
 ASTEPRO 0.15 % (205.5 mcg) nasal spray Generic drug:  Azelastine  
two (2) times a day. ciprofloxacin 0.2 % otic solution Commonly known as:  CETRAXAL Administer 0.25 mL in right ear every twelve (12) hours for 7 days. ergocalciferol 50,000 unit capsule Commonly known as:  ERGOCALCIFEROL Take 1 Cap by mouth every seven (7) days. FLUoxetine 40 mg capsule Commonly known as:  PROzac Take 1 Cap by mouth daily. levoFLOXacin 500 mg tablet Commonly known as:  LEVAQUIN  
TAKE 1 TABLET BY MOUTH EVERY DAY FOR 10 DAYS  
  
 levothyroxine 88 mcg tablet Commonly known as:  SYNTHROID Take 1 Tab by mouth Daily (before breakfast). Indications: BRAND  
  
 NASONEX 50 mcg/actuation nasal spray Generic drug:  mometasone 2 sprays daily. traZODone 50 mg tablet Commonly known as:  Jeanmarie Cushing Take 1 tablet by mouth 2 hours before bedtime VAGIFEM 10 mcg Tab vaginal tablet Generic drug:  estradiol Insert 10 mcg into vagina daily. Prescriptions Sent to Pharmacy Refills FLUoxetine (PROZAC) 40 mg capsule 3 Sig: Take 1 Cap by mouth daily. Class: Normal  
 [id number] [address] Ph #: [phone] Route: Oral  
 levothyroxine (SYNTHROID) 88 mcg tablet 2 Sig: Take 1 Tab by mouth Daily (before breakfast). Indications: BRAND Class: Normal  
 [id number] [address] Ph #: [phone] Route: Oral  
 ciprofloxacin (CETRAXAL) 0.2 % otic solution 0 Sig: Administer 0.25 mL in right ear every twelve (12) hours for 7 days. Class: Normal  
 [id number] [address] Ph #: [phone] Route: Right Ear Follow-up Instructions Return in about 2 weeks (around 6/8/2017) for Follow-up Otitis externa (right). Patient Instructions It was a pleasure to see you again! Swimmer's Ear: Care Instructions Your Care Instructions Swimmer's ear (otitis externa) is inflammation or infection of the ear canal. This is the passage that leads from the outer ear to the eardrum. Any water, sand, or other debris that gets into the ear canal and stays there can cause swimmer's ear. Putting cotton swabs or other items in the ear to clean it can also cause this problem. Swimmer's ear can be very painful. But you can treat the pain and infection with medicines. You should feel better in a few days. Follow-up care is a key part of your treatment and safety. Be sure to make and go to all appointments, and call your doctor if you are having problems. It's also a good idea to know your test results and keep a list of the medicines you take. How can you care for yourself at home? Cleaning and care · Use antibiotic drops as your doctor directs. · Do not insert ear drops (other than the antibiotic ear drops) or anything else into the ear unless your doctor has told you to. · Avoid getting water in the ear until the problem clears up. Use cotton lightly coated with petroleum jelly as an earplug. Do not use plastic earplugs. · Use a hair dryer set on low to carefully dry the ear after you shower. · To ease ear pain, hold a warm washcloth against your ear. · Take pain medicines exactly as directed. ¨ If the doctor gave you a prescription medicine for pain, take it as prescribed. ¨ If you are not taking a prescription pain medicine, ask your doctor if you can take an over-the-counter medicine. Inserting ear drops · Warm the drops to body temperature by rolling the container in your hands. Or you can place it in a cup of warm water for a few minutes. · Lie down, with your ear facing up. · Place drops inside the ear. Follow your doctor's instructions (or the directions on the label) for how many drops to use. Gently wiggle the outer ear or pull the ear up and back to help the drops get into the ear. · It's important to keep the liquid in the ear canal for 3 to 5 minutes. When should you call for help? Call your doctor now or seek immediate medical care if: 
· You have a new or higher fever. · You have new or worse pain, swelling, warmth, or redness around or behind your ear. · You have new or increasing pus or blood draining from your ear. Watch closely for changes in your health, and be sure to contact your doctor if: 
· You are not getting better after 2 days (48 hours). Where can you learn more? Go to http://bello-alejandro.info/. Enter C706 in the search box to learn more about \"Swimmer's Ear: Care Instructions. \" Current as of: July 29, 2016 Content Version: 11.2 © 2544-3029 [employer]. Care instructions adapted under license by [employer] (which disclaims liability or warranty for this information). If you have questions about a medical condition or this instruction, always ask your healthcare professional. [place] [address] any warranty or liability for your use of this information. Introducing [facility]! [employer] introduces [employer] patient portal. Now you can access parts of your medical record, email your doctor's office, and request medication refills online. 1. In your internet browser, go to [email or web address] "[email or web address]/[email or web address] 2. Click on the First Time User? Click Here link in the Sign In box. You will see the New Member Sign Up page. 3. Enter your [employer] Access Code exactly as it appears below. You will not need to use this code after youve completed the sign-up process. If you do not sign up before the expiration date, you must request a new code. · [employer] Access Code: [email or web address] Expires: 8/23/2017  9:44 AM 
 
4. Enter the last four digits of your Social Security Number (xxxx) and Date of Birth (mm/dd/yyyy) as indicated and click Submit. You will be taken to the next sign-up page. 5. Create a Beetle Beats ID. This will be your [employer] login ID and cannot be changed, so think of one that is secure and easy to remember. 6. Create a [employer] password. You can change your password at any time. 7. Enter your Password Reset Question and Answer. This can be used at a later time if you forget your password. 8. Enter your e-mail address. You will receive e-mail notification when new information is available in [address] 9. Click Sign Up. You can now view and download portions of your medical record. 10. Click the Download Summary menu link to download a portable copy of your medical information. If you have questions, please visit the Frequently Asked Questions section of the [employer] website. Remember, [employer] is NOT to be used for urgent needs. For medical emergencies, dial 911. Now available from your iPhone and Android! Please provide this summary of care documentation to your next provider. Your primary care clinician is listed as Rambo Bowman. If you have any questions after today's visit, please call [phone].

## 2017-05-25 NOTE — PATIENT INSTRUCTIONS
It was a pleasure to see you again! Swimmer's Ear: Care Instructions  Your Care Instructions    Swimmer's ear (otitis externa) is inflammation or infection of the ear canal. This is the passage that leads from the outer ear to the eardrum. Any water, sand, or other debris that gets into the ear canal and stays there can cause swimmer's ear. Putting cotton swabs or other items in the ear to clean it can also cause this problem. Swimmer's ear can be very painful. But you can treat the pain and infection with medicines. You should feel better in a few days. Follow-up care is a key part of your treatment and safety. Be sure to make and go to all appointments, and call your doctor if you are having problems. It's also a good idea to know your test results and keep a list of the medicines you take. How can you care for yourself at home? Cleaning and care  · Use antibiotic drops as your doctor directs. · Do not insert ear drops (other than the antibiotic ear drops) or anything else into the ear unless your doctor has told you to. · Avoid getting water in the ear until the problem clears up. Use cotton lightly coated with petroleum jelly as an earplug. Do not use plastic earplugs. · Use a hair dryer set on low to carefully dry the ear after you shower. · To ease ear pain, hold a warm washcloth against your ear. · Take pain medicines exactly as directed. ¨ If the doctor gave you a prescription medicine for pain, take it as prescribed. ¨ If you are not taking a prescription pain medicine, ask your doctor if you can take an over-the-counter medicine. Inserting ear drops  · Warm the drops to body temperature by rolling the container in your hands. Or you can place it in a cup of warm water for a few minutes. · Lie down, with your ear facing up. · Place drops inside the ear. Follow your doctor's instructions (or the directions on the label) for how many drops to use.  Gently wiggle the outer ear or pull the ear up and back to help the drops get into the ear. · It's important to keep the liquid in the ear canal for 3 to 5 minutes. When should you call for help? Call your doctor now or seek immediate medical care if:  · You have a new or higher fever. · You have new or worse pain, swelling, warmth, or redness around or behind your ear. · You have new or increasing pus or blood draining from your ear. Watch closely for changes in your health, and be sure to contact your doctor if:  · You are not getting better after 2 days (48 hours). Where can you learn more? Go to http://bello-alejandro.info/. Enter C706 in the search box to learn more about \"Swimmer's Ear: Care Instructions. \"  Current as of: July 29, 2016  Content Version: 11.2  © 4657-9669 [employer]. Care instructions adapted under license by [employer] (which disclaims liability or warranty for this information). If you have questions about a medical condition or this instruction, always ask your healthcare professional. [address] any warranty or liability for your use of this information.

## 2017-06-21 ENCOUNTER — OFFICE VISIT (OUTPATIENT)
Dept: INTERNAL MEDICINE CLINIC | Age: 62
End: 2017-06-21

## 2017-06-21 VITALS
HEART RATE: 76 BPM | BODY MASS INDEX: 27.42 KG/M2 | HEIGHT: 66 IN | DIASTOLIC BLOOD PRESSURE: 68 MMHG | WEIGHT: 170.6 LBS | RESPIRATION RATE: 16 BRPM | SYSTOLIC BLOOD PRESSURE: 100 MMHG | OXYGEN SATURATION: 97 % | TEMPERATURE: 97.9 F

## 2017-06-21 DIAGNOSIS — H60.8X3 CHRONIC ECZEMATOUS OTITIS EXTERNA OF BOTH EARS: Primary | ICD-10-CM

## 2017-06-21 DIAGNOSIS — H61.21 CERUMINOSIS, RIGHT: ICD-10-CM

## 2017-06-21 NOTE — PROGRESS NOTES
Chief Complaint   Patient presents with    Ear Fullness     1. Have you been to the ER, urgent care clinic since your last visit? Hospitalized since your last visit? No    2. Have you seen or consulted any other health care providers outside of the [address] [place] [address] since your last visit? Include any pap smears or colon screening.  Yes When: 5/2017 Where: Gynecologist Reason for visit: Annual Exam

## 2017-06-21 NOTE — PATIENT INSTRUCTIONS
It was a pleasure to see you! As discussed:    Schedule with your ENT  In the interim try the suggestions below. Earwax Blockage: Care Instructions  Your Care Instructions    Earwax is a natural substance that protects the ear canal. Normally, earwax drains from the ears and does not cause problems. Sometimes earwax builds up and hardens. Earwax blockage (also called cerumen impaction) can cause some loss of hearing and pain. When wax is tightly packed, you will need to have your doctor remove it. Follow-up care is a key part of your treatment and safety. Be sure to make and go to all appointments, and call your doctor if you are having problems. Its also a good idea to know your test results and keep a list of the medicines you take. How can you care for yourself at home? · Do not try to remove earwax with cotton swabs, fingers, or other objects. This can make the blockage worse and damage the eardrum. · If your doctor recommends that you try to remove earwax at home:  ¨ Soften and loosen the earwax with warm mineral oil. You also can try hydrogen peroxide mixed with an equal amount of room temperature water. Place 2 drops of the fluid, warmed to body temperature, in the ear two times a day for up to 5 days. ¨ Once the wax is loose and soft, all that is usually needed to remove it from the ear canal is a gentle, warm shower. Direct the water into the ear, then tip your head to let the earwax drain out. Dry your ear thoroughly with a hair dryer set on low. Hold the dryer several inches from your ear. ¨ If the warm mineral oil and shower do not work, use an over-the-counter wax softener followed by gentle flushing with an ear syringe each night for a week or two. Make sure the flushing solution is body temperature. Cool or hot fluids in the ear can cause dizziness. When should you call for help? Call your doctor now or seek immediate medical care if:  · Pus or blood drains from your ear.   · Your ears are ringing or feel full. · You have a loss of hearing. Watch closely for changes in your health, and be sure to contact your doctor if:  · You have pain or reduced hearing after 1 week of home treatment. · You have any new symptoms, such as nausea or balance problems. Where can you learn more? Go to http://bello-alejandro.info/. Enter Z441 in the search box to learn more about \"Earwax Blockage: Care Instructions. \"  Current as of: March 20, 2017  Content Version: 11.3  © 8692-3111 [employer]. Care instructions adapted under license by [employer] (which disclaims liability or warranty for this information). If you have questions about a medical condition or this instruction, always ask your healthcare professional. [address] any warranty or liability for your use of this information.

## 2017-06-21 NOTE — MR AVS SNAPSHOT
Visit Information Date & Time Provider Department Dept. Phone Encounter #  
 6/21/2017 11:30 AM Perez Gresham MD [facility] [phone] [id number] Follow-up Instructions Return if symptoms worsen or fail to improve before ENT appointment. Upcoming Health Maintenance Date Due FOBT Q 1 YEAR AGE 50-75 11/30/2007 INFLUENZA AGE 9 TO ADULT 8/1/2017 BREAST CANCER SCRN MAMMOGRAM 5/12/2018 PAP AKA CERVICAL CYTOLOGY 5/1/2019 DTaP/Tdap/Td series (2 - Td) 5/27/2022 Allergies as of 6/21/2017  Review Complete On: 6/21/2017 By: Perez Gresham MD  
  
 Severity Noted Reaction Type Reactions Codeine  11/24/2014   Not Verified Unknown (comments) Sudafed [Pseudoephedrine Hcl]  11/24/2014   Side Effect Palpitations Sulfa (Sulfonamide Antibiotics)  11/24/2014   Systemic Itching Current Immunizations  Never Reviewed No immunizations on file. Not reviewed this visit You Were Diagnosed With   
  
 Codes Comments Chronic eczematous otitis externa of both ears    -  Primary ICD-10-CM: E31.2S6 ICD-9-CM: 380.23 Ceruminosis, right     ICD-10-CM: H61.21 ICD-9-CM: 380.4 Vitals BP Pulse Temp Resp Height(growth percentile) Weight(growth percentile) 100/68 (BP 1 Location: Right arm, BP Patient Position: Sitting) 76 97.9 °F (36.6 °C) (Oral) 16 5' 6\" (1.676 m) 170 lb 9.6 oz (77.4 kg) SpO2 BMI OB Status Smoking Status 97% 27.54 kg/m2 Unknown Never Smoker Vitals History BMI and BSA Data Body Mass Index Body Surface Area  
 27.54 kg/m 2 1.9 m 2 Preferred Pharmacy Pharmacy Name Phone [id number] [address] [facility] [address] [phone] Your Updated Medication List  
  
   
This list is accurate as of: 6/21/17 12:16 PM.  Always use your most recent med list.  
  
  
  
  
 ASTEPRO 0.15 % (205.5 mcg) nasal spray Generic drug:  Azelastine  
two (2) times a day. ergocalciferol 50,000 unit capsule Commonly known as:  ERGOCALCIFEROL Take 1 Cap by mouth every seven (7) days. FLUoxetine 40 mg capsule Commonly known as:  PROzac Take 1 Cap by mouth daily. levoFLOXacin 500 mg tablet Commonly known as:  LEVAQUIN  
TAKE 1 TABLET BY MOUTH EVERY DAY FOR 10 DAYS  
  
 levothyroxine 88 mcg tablet Commonly known as:  SYNTHROID Take 1 Tab by mouth Daily (before breakfast). Indications: BRAND MAGNESIUM PO Take 1 Tab by mouth daily. NASONEX 50 mcg/actuation nasal spray Generic drug:  mometasone 2 sprays daily. traZODone 50 mg tablet Commonly known as:  Hermon Garcia Take 1 tablet by mouth 2 hours before bedtime VAGIFEM 10 mcg Tab vaginal tablet Generic drug:  estradiol Insert 10 mcg into vagina daily. We Performed the Following REFERRAL TO ENT-OTOLARYNGOLOGY [NLE34 Custom] Follow-up Instructions Return if symptoms worsen or fail to improve before ENT appointment. Referral Information Referral ID Referred By Referred To  
  
 [id number] VONDA, [facility] Jose Flores MD   
   [address] [place] [address] Phone: [phone] Fax: [phone] Visits Status Start Date End Date 1 New Request 6/21/17 6/21/18 If your referral has a status of pending review or denied, additional information will be sent to support the outcome of this decision. Patient Instructions It was a pleasure to see you! As discussed: 
 
Schedule with your ENT In the interim try the suggestions below. Earwax Blockage: Care Instructions Your Care Instructions Earwax is a natural substance that protects the ear canal. Normally, earwax drains from the ears and does not cause problems. Sometimes earwax builds up and hardens. Earwax blockage (also called cerumen impaction) can cause some loss of hearing and pain.  When wax is tightly packed, you will need to have your doctor remove it. Follow-up care is a key part of your treatment and safety. Be sure to make and go to all appointments, and call your doctor if you are having problems. Its also a good idea to know your test results and keep a list of the medicines you take. How can you care for yourself at home? · Do not try to remove earwax with cotton swabs, fingers, or other objects. This can make the blockage worse and damage the eardrum. · If your doctor recommends that you try to remove earwax at home: ¨ Soften and loosen the earwax with warm mineral oil. You also can try hydrogen peroxide mixed with an equal amount of room temperature water. Place 2 drops of the fluid, warmed to body temperature, in the ear two times a day for up to 5 days. ¨ Once the wax is loose and soft, all that is usually needed to remove it from the ear canal is a gentle, warm shower. Direct the water into the ear, then tip your head to let the earwax drain out. Dry your ear thoroughly with a hair dryer set on low. Hold the dryer several inches from your ear. ¨ If the warm mineral oil and shower do not work, use an over-the-counter wax softener followed by gentle flushing with an ear syringe each night for a week or two. Make sure the flushing solution is body temperature. Cool or hot fluids in the ear can cause dizziness. When should you call for help? Call your doctor now or seek immediate medical care if: · Pus or blood drains from your ear. · Your ears are ringing or feel full. · You have a loss of hearing. Watch closely for changes in your health, and be sure to contact your doctor if: 
· You have pain or reduced hearing after 1 week of home treatment. · You have any new symptoms, such as nausea or balance problems. Where can you learn more? Go to http://bello-alejandro.info/. Enter H467 in the search box to learn more about \"Earwax Blockage: Care Instructions. \" Current as of: March 20, 2017 Content Version: 11.3 © 3629-6754 [employer] Care instructions adapted under license by [employer] (which disclaims liability or warranty for this information). If you have questions about a medical condition or this instruction, always ask your healthcare professional. [address] any warranty or liability for your use of this information. Introducing [facility]! Sree Short introduces [employer] patient portal. Now you can access parts of your medical record, email your doctor's office, and request medication refills online. 1. In your internet browser, go to [email or web address] 2. Click on the First Time User? Click Here link in the Sign In box. You will see the New Member Sign Up page. 3. Enter your [employer] Access Code exactly as it appears below. You will not need to use this code after youve completed the sign-up process. If you do not sign up before the expiration date, you must request a new code. · [employer] Access Code: [email or web address] Expires: 8/23/2017  9:44 AM 
 
4. Enter the last four digits of your Social Security Number (xxxx) and Date of Birth (mm/dd/yyyy) as indicated and click Submit. You will be taken to the next sign-up page. 5. Create a [employer] ID. This will be your [employer] login ID and cannot be changed, so think of one that is secure and easy to remember. 6. Create a [employer] password. You can change your password at any time. 7. Enter your Password Reset Question and Answer. This can be used at a later time if you forget your password. 8. Enter your e-mail address. You will receive e-mail notification when new information is available in [address] 9. Click Sign Up. You can now view and download portions of your medical record. 10. Click the Download Summary menu link to download a portable copy of your medical information. If you have questions, please visit the Frequently Asked Questions section of the [employer] website. Remember, [employer] is NOT to be used for urgent needs. For medical emergencies, dial 911. Now available from your iPhone and Android! Please provide this summary of care documentation to your next provider. Your primary care clinician is listed as Kalee Jeffers. If you have any questions after today's visit, please call [phone].

## 2017-06-21 NOTE — PROGRESS NOTES
HISTORY OF PRESENT ILLNESS  Sky Khalil is a 63 y.o. female. Ear Fullness    The problem has been gradually improving (After abx drops ). Patient complains that both ears are affected. There has been no fever. The pain is at a severity of 0/10. The patient is experiencing no pain. Pertinent negatives include no hearing loss, no rhinorrhea, no sore throat and no cough. Associated symptoms comments: Positional dizziness . Seen 2 weeks ago and placed Cipro ggt x 1 week. Review of Systems   HENT: Negative for hearing loss, rhinorrhea and sore throat. Respiratory: Negative for cough. Patient Active Problem List    Diagnosis Date Noted    HLD (hyperlipidemia) 06/15/2016    Acquired hypothyroidism 06/15/2016    Reactive depression 06/15/2016    Sinus problem        Current Outpatient Prescriptions   Medication Sig Dispense Refill    MAGNESIUM PO Take 1 Tab by mouth daily.  FLUoxetine (PROZAC) 40 mg capsule Take 1 Cap by mouth daily. 90 Cap 3    levothyroxine (SYNTHROID) 88 mcg tablet Take 1 Tab by mouth Daily (before breakfast). Indications: BRAND 90 Tab 2    mometasone (NASONEX) 50 mcg/actuation nasal spray 2 sprays daily.  Azelastine (ASTEPRO) 0.15 % (205.5 mcg) nasal spray two (2) times a day.  ergocalciferol (ERGOCALCIFEROL) 50,000 unit capsule Take 1 Cap by mouth every seven (7) days. 8 Cap 0    levoFLOXacin (LEVAQUIN) 500 mg tablet TAKE 1 TABLET BY MOUTH EVERY DAY FOR 10 DAYS  0    traZODone (DESYREL) 50 mg tablet Take 1 tablet by mouth 2 hours before bedtime 30 Tab 1    estradiol (VAGIFEM) 10 mcg tab vaginal tablet Insert 10 mcg into vagina daily.          Allergies   Allergen Reactions    Codeine Unknown (comments)    Sudafed [Pseudoephedrine Hcl] Palpitations    Sulfa (Sulfonamide Antibiotics) Itching      Visit Vitals    /68 (BP 1 Location: Right arm, BP Patient Position: Sitting)    Pulse 76    Temp 97.9 °F (36.6 °C) (Oral)    Resp 16    Ht 5' 6\" (1.676 m)  Wt 170 lb 9.6 oz (77.4 kg)    SpO2 97%    BMI 27.54 kg/m2       Physical Exam   Constitutional: She appears well-developed. No distress. HENT:   Right Ear: No drainage. Left Ear: No drainage. Nose: Mucosal edema present. No rhinorrhea or septal deviation. Right sinus exhibits no maxillary sinus tenderness and no frontal sinus tenderness. Left sinus exhibits no maxillary sinus tenderness and no frontal sinus tenderness. Mouth/Throat: No oropharyngeal exudate, posterior oropharyngeal edema or posterior oropharyngeal erythema. Bilateral ears: scaly erythematous   R ear: cerumen against TM    Eyes: Conjunctivae are normal.   Neck: Neck supple. Cardiovascular: Normal rate, regular rhythm and normal heart sounds. Pulmonary/Chest: Effort normal and breath sounds normal. No respiratory distress. She has no wheezes. She has no rales. She exhibits no tenderness. Lymphadenopathy:     She has no cervical adenopathy. ASSESSMENT and PLAN  Darline Cade was seen today for ear fullness due rhinitis. Resume Nasonex and Asteopro. See ENT for evaluation. Red flags to warrant ER or earlier clinical evaluation reviewed. See AVS for full details of plan and patient discussion. Diagnoses and all orders for this visit:    Chronic eczematous otitis externa of both ears  -     REFERRAL TO ENT-OTOLARYNGOLOGY    Ceruminosis, right  -     REFERRAL TO ENT-OTOLARYNGOLOGY      Follow-up Disposition:  Return if symptoms worsen or fail to improve before ENT appointment. Medication risks/benefits/costs/interactions/alternatives discussed with patient. Virgil Cool  was given an after visit summary which includes diagnoses, current medications, & vitals. she expressed understanding with the diagnosis and plan.

## 2017-08-15 ENCOUNTER — OFFICE VISIT (OUTPATIENT)
Dept: INTERNAL MEDICINE CLINIC | Age: 62
End: 2017-08-15

## 2017-08-15 VITALS
RESPIRATION RATE: 16 BRPM | DIASTOLIC BLOOD PRESSURE: 72 MMHG | OXYGEN SATURATION: 95 % | BODY MASS INDEX: 26.81 KG/M2 | SYSTOLIC BLOOD PRESSURE: 96 MMHG | TEMPERATURE: 97.7 F | HEIGHT: 66 IN | HEART RATE: 69 BPM | WEIGHT: 166.8 LBS

## 2017-08-15 DIAGNOSIS — A09 TRAVELER'S DIARRHEA: Primary | ICD-10-CM

## 2017-08-15 RX ORDER — AZITHROMYCIN 500 MG/1
1000 TABLET, FILM COATED ORAL ONCE
Qty: 2 TAB | Refills: 0 | Status: SHIPPED | OUTPATIENT
Start: 2017-08-15 | End: 2017-08-15

## 2017-08-15 NOTE — PROGRESS NOTES
HISTORY OF PRESENT ILLNESS  Mandi Webster is a 63 y.o. female. Diarrhea    This is a new problem. The current episode started more than 1 week ago (2 weeks ago upon return from [place] ). The problem occurs 2 to 4 times per day (once in even voluminous (\"flushes 3-4\") ). The problem has not changed since onset. Patient reports a subjective fever - was not measured. The stool consistency is described as watery (brown ). Associated symptoms include abdominal pain and chills. Pertinent negatives include no vomiting, no sweats and no headaches. Risk factors include foreign travel, travel to endemic areas and ill contacts. She has tried anti-motility drugs and increased fluid intake for the symptoms. The treatment provided moderate relief. Review of Systems   Constitutional: Positive for chills. Gastrointestinal: Positive for abdominal pain and diarrhea. Negative for vomiting. Neurological: Negative for headaches. Patient Active Problem List    Diagnosis Date Noted    HLD (hyperlipidemia) 06/15/2016    Acquired hypothyroidism 06/15/2016    Reactive depression 06/15/2016    Sinus problem        Current Outpatient Prescriptions   Medication Sig Dispense Refill    MAGNESIUM PO Take 1 Tab by mouth daily.  FLUoxetine (PROZAC) 40 mg capsule Take 1 Cap by mouth daily. 90 Cap 3    levothyroxine (SYNTHROID) 88 mcg tablet Take 1 Tab by mouth Daily (before breakfast). Indications: BRAND 90 Tab 2    mometasone (NASONEX) 50 mcg/actuation nasal spray 2 sprays daily.  Azelastine (ASTEPRO) 0.15 % (205.5 mcg) nasal spray two (2) times a day.          Allergies   Allergen Reactions    Codeine Unknown (comments)    Sudafed [Pseudoephedrine Hcl] Palpitations    Sulfa (Sulfonamide Antibiotics) Itching      Visit Vitals    BP 96/72 (BP 1 Location: Right arm, BP Patient Position: Sitting)    Pulse 69    Temp 97.7 °F (36.5 °C) (Oral)    Resp 16    Ht 5' 6\" (1.676 m)    Wt 166 lb 12.8 oz (75.7 kg)    SpO2 95%    BMI 26.92 kg/m2         Physical Exam   Constitutional: She is oriented to person, place, and time. No distress. Cardiovascular: Normal rate, regular rhythm and normal heart sounds. Pulmonary/Chest: Breath sounds normal. No respiratory distress. She has no wheezes. She has no rales. Abdominal: She exhibits distension (soft tympanic ). Bowel sounds are decreased. There is tenderness in the left upper quadrant and left lower quadrant. Neurological: She is alert and oriented to person, place, and time. ASSESSMENT and PLAN  Diagnoses and all orders for this visit:    1. Traveler's diarrhea- recent travel to [place]. Check stool studies to r/o giardia. Start with empiric bacterial treatment. Optimize oral hydration. Check for electrolyte disturbance given duration. If no improvement and/or labs wnl will check for other cause such as diverticulitis with imaging. Red flags to warrant ER or earlier clinical evaluation reviewed. See AVS for full details of plan and patient discussion.     -     WBC, STOOL  -     CULTURE, STOOL  -     OVA & PARASITES, STOOL  -     METABOLIC PANEL, BASIC  -     MAGNESIUM  -     CBC WITH AUTOMATED DIFF  -     azithromycin (ZITHROMAX) 500 mg tab; Take 2 Tabs by mouth once for 1 dose. Follow-up Disposition:  Return in about 10 days (around 8/25/2017), or if symptoms worsen or fail to improve within 10 days. Medication risks/benefits/costs/interactions/alternatives discussed with patient. Malinda Peña  was given an after visit summary which includes diagnoses, current medications, & vitals. she expressed understanding with the diagnosis and plan.

## 2017-08-15 NOTE — MR AVS SNAPSHOT
Visit Information Date & Time Provider Department Dept. Phone Encounter #  
 8/15/2017 10:30 AM Alan Barry MD [facility] Internal Medicine [phone] [id number] Follow-up Instructions Return in about 10 days (around 8/25/2017), or if symptoms worsen or fail to improve within 10 days. Upcoming Health Maintenance Date Due FOBT Q 1 YEAR AGE 50-75 11/30/2007 INFLUENZA AGE 9 TO ADULT 8/1/2017 BREAST CANCER SCRN MAMMOGRAM 5/12/2018 PAP AKA CERVICAL CYTOLOGY 5/1/2019 DTaP/Tdap/Td series (2 - Td) 5/27/2022 Allergies as of 8/15/2017  Review Complete On: 8/15/2017 By: Alan Barry MD  
  
 Severity Noted Reaction Type Reactions Codeine  11/24/2014   Not Verified Unknown (comments) Sudafed [Pseudoephedrine Hcl]  11/24/2014   Side Effect Palpitations Sulfa (Sulfonamide Antibiotics)  11/24/2014   Systemic Itching Current Immunizations  Never Reviewed No immunizations on file. Not reviewed this visit You Were Diagnosed With   
  
 Codes Comments Traveler's diarrhea    -  Primary ICD-10-CM: Z43 ICD-9-CM: 124. 2 Vitals BP Pulse Temp Resp Height(growth percentile) Weight(growth percentile) 96/72 (BP 1 Location: Right arm, BP Patient Position: Sitting) 69 97.7 °F (36.5 °C) (Oral) 16 5' 6\" (1.676 m) 166 lb 12.8 oz (75.7 kg) SpO2 BMI OB Status Smoking Status 95% 26.92 kg/m2 Unknown Never Smoker Vitals History BMI and BSA Data Body Mass Index Body Surface Area  
 26.92 kg/m 2 1.88 m 2 Preferred Pharmacy Pharmacy Name Phone [facility]/PHARMACY #[id number] - [place] [id number] [facility] [address].. [phone] Your Updated Medication List  
  
   
This list is accurate as of: 8/15/17 11:32 AM.  Always use your most recent med list.  
  
  
  
  
 ASTEPRO 0.15 % (205.5 mcg) nasal spray Generic drug:  Azelastine  
two (2) times a day. azithromycin 500 mg Tab Commonly known as:  You Gist Take 2 Tabs by mouth once for 1 dose. FLUoxetine 40 mg capsule Commonly known as:  PROzac Take 1 Cap by mouth daily. levothyroxine 88 mcg tablet Commonly known as:  SYNTHROID Take 1 Tab by mouth Daily (before breakfast). Indications: BRAND MAGNESIUM PO Take 1 Tab by mouth daily. NASONEX 50 mcg/actuation nasal spray Generic drug:  mometasone 2 sprays daily. Prescriptions Sent to Pharmacy Refills  
 azithromycin (ZITHROMAX) 500 mg tab 0 Sig: Take 2 Tabs by mouth once for 1 dose. Class: Normal  
 Pharmacy: [facility]/pharmacy #[id number] - [address]  #: [phone] Route: Oral  
  
We Performed the Following CBC WITH AUTOMATED DIFF [09465 CPT(R)] CULTURE, STOOL [id number] CPT(R)] MAGNESIUM [id number] CPT(R)] METABOLIC PANEL, BASIC [36938 CPT(R)] OVA & PARASITES, STOOL [id number] CPT(R)] WBC, STOOL [62918 CPT(R)] Follow-up Instructions Return in about 10 days (around 8/25/2017), or if symptoms worsen or fail to improve within 10 days. Patient Instructions It was a pleasure to see you! As discussed: 
 
 
Diarrhea I have ordered labs to evaluate your symptoms Please complete the stool studies and blood work. I will order imaging if needed. Stay well hydrated. Drink sports drinks- Gatorade, Powerade, or similar drink at least 32oz/ day. For every glass of water you drink have 2-3 crackers or a meal. Salt is essential to keeping fluid in your body. The sign that you drank enough is for your urine to be very light in color and not feeling dizziness If your pain/ discomfort  worsens or you develop fevers, chills, nausea, or vomiting  Return for urgent reevaluation. Seek immediate medical attention if your symptoms are severe. Diarrhea: Care Instructions Your Care Instructions Diarrhea is loose, watery stools (bowel movements).  The exact cause is often hard to find. Sometimes diarrhea is your body's way of getting rid of what caused an upset stomach. Viruses, food poisoning, and many medicines can cause diarrhea. Some people get diarrhea in response to emotional stress, anxiety, or certain foods. Almost everyone has diarrhea now and then. It usually isn't serious, and your stools will return to normal soon. The important thing to do is replace the fluids you have lost, so you can prevent dehydration. The doctor has checked you carefully, but problems can develop later. If you notice any problems or new symptoms, get medical treatment right away. Follow-up care is a key part of your treatment and safety. Be sure to make and go to all appointments, and call your doctor if you are having problems. It's also a good idea to know your test results and keep a list of the medicines you take. How can you care for yourself at home? · Watch for signs of dehydration, which means your body has lost too much water. Dehydration is a serious condition and should be treated right away. Signs of dehydration are: 
¨ Increasing thirst and dry eyes and mouth. ¨ Feeling faint or lightheaded. ¨ Darker urine, and a smaller amount of urine than normal. 
· To prevent dehydration, drink plenty of fluids, enough so that your urine is light yellow or clear like water. Choose water and other caffeine-free clear liquids until you feel better. If you have kidney, heart, or liver disease and have to limit fluids, talk with your doctor before you increase the amount of fluids you drink. · Begin eating small amounts of mild foods the next day, if you feel like it. ¨ Try yogurt that has live cultures of Lactobacillus. (Check the label.) ¨ Avoid spicy foods, fruits, alcohol, and caffeine until 48 hours after all symptoms are gone. ¨ Avoid chewing gum that contains sorbitol.  
¨ Avoid dairy products (except for yogurt with Lactobacillus) while you have diarrhea and for 3 days after symptoms are gone. · The doctor may recommend that you take over-the-counter medicine, such as loperamide (Imodium), if you still have diarrhea after 6 hours. Read and follow all instructions on the label. Do not use this medicine if you have bloody diarrhea, a high fever, or other signs of serious illness. Call your doctor if you think you are having a problem with your medicine. When should you call for help? Call 911 anytime you think you may need emergency care. For example, call if: 
· You passed out (lost consciousness). · Your stools are maroon or very bloody. Call your doctor now or seek immediate medical care if: 
· You are dizzy or lightheaded, or you feel like you may faint. · Your stools are black and look like tar, or they have streaks of blood. · You have new or worse belly pain. · You have symptoms of dehydration, such as: ¨ Dry eyes and a dry mouth. ¨ Passing only a little dark urine. ¨ Feeling thirstier than usual. 
· You have a new or higher fever. Watch closely for changes in your health, and be sure to contact your doctor if: 
· Your diarrhea is getting worse. · You see pus in the diarrhea. · You are not getting better after 2 days (48 hours). Where can you learn more? Go to http://bello-alejandro.info/. Enter T832 in the search box to learn more about \"Diarrhea: Care Instructions. \" Current as of: March 20, 2017 Content Version: 11.3 © 2506-1904 Healthwise, Incorporated. Care instructions adapted under license by [employer] (which disclaims liability or warranty for this information). If you have questions about a medical condition or this instruction, always ask your healthcare professional. [place] [address] any warranty or liability for your use of this information. Introducing [facility]!    
 Sol Aleman introduces [employer] patient portal. Now you can access parts of your medical record, email your doctor's office, and request medication refills online. 1. In your internet browser, go to [email or web address] 2. Click on the First Time User? Click Here link in the Sign In box. You will see the New Member Sign Up page. 3. Enter your SpanDeX Access Code exactly as it appears below. You will not need to use this code after youve completed the sign-up process. If you do not sign up before the expiration date, you must request a new code. · SpanDeX Access Code: [email or web address] Expires: 8/23/2017  9:44 AM 
 
4. Enter the last four digits of your Social Security Number (xxxx) and Date of Birth (mm/dd/yyyy) as indicated and click Submit. You will be taken to the next sign-up page. 5. Create a SpanDeX ID. This will be your SpanDeX login ID and cannot be changed, so think of one that is secure and easy to remember. 6. Create a SpanDeX password. You can change your password at any time. 7. Enter your Password Reset Question and Answer. This can be used at a later time if you forget your password. 8. Enter your e-mail address. You will receive e-mail notification when new information is available in [address] 9. Click Sign Up. You can now view and download portions of your medical record. 10. Click the Download Summary menu link to download a portable copy of your medical information. If you have questions, please visit the Frequently Asked Questions section of the SpanDeX website. Remember, SpanDeX is NOT to be used for urgent needs. For medical emergencies, dial 911. Now available from your iPhone and Android! Please provide this summary of care documentation to your next provider. Your primary care clinician is listed as Oneda Farley. If you have any questions after today's visit, please call [phone].

## 2017-08-15 NOTE — PROGRESS NOTES
Chief Complaint   Patient presents with    Diarrhea     1. Have you been to the ER, urgent care clinic since your last visit? Hospitalized since your last visit? No    2. Have you seen or consulted any other health care providers outside of the [address] [place] [address] since your last visit? Include any pap smears or colon screening. Yes When: 8/14/2017 Where: Chiropractor Reason for visit: neck/back    Patient states diarrhea for 2 weeks, pain in abdominal area in evening, fatigue.  Vacation in [facility], started Sunday 7/30/2017

## 2017-08-15 NOTE — PATIENT INSTRUCTIONS
It was a pleasure to see you! As discussed:      Diarrhea  I have ordered labs to evaluate your symptoms  Please complete the stool studies and blood work. I will order imaging if needed. Stay well hydrated. Drink sports drinks- Gatorade, Powerade, or similar drink at least 32oz/ day. For every glass of water you drink have 2-3 crackers or a meal. Salt is essential to keeping fluid in your body. The sign that you drank enough is for your urine to be very light in color and not feeling dizziness     If your pain/ discomfort  worsens or you develop fevers, chills, nausea, or vomiting  Return for urgent reevaluation. Seek immediate medical attention if your symptoms are severe. Diarrhea: Care Instructions  Your Care Instructions    Diarrhea is loose, watery stools (bowel movements). The exact cause is often hard to find. Sometimes diarrhea is your body's way of getting rid of what caused an upset stomach. Viruses, food poisoning, and many medicines can cause diarrhea. Some people get diarrhea in response to emotional stress, anxiety, or certain foods. Almost everyone has diarrhea now and then. It usually isn't serious, and your stools will return to normal soon. The important thing to do is replace the fluids you have lost, so you can prevent dehydration. The doctor has checked you carefully, but problems can develop later. If you notice any problems or new symptoms, get medical treatment right away. Follow-up care is a key part of your treatment and safety. Be sure to make and go to all appointments, and call your doctor if you are having problems. It's also a good idea to know your test results and keep a list of the medicines you take. How can you care for yourself at home? · Watch for signs of dehydration, which means your body has lost too much water. Dehydration is a serious condition and should be treated right away.  Signs of dehydration are:  ¨ Increasing thirst and dry eyes and mouth.  ¨ Feeling faint or lightheaded. ¨ Darker urine, and a smaller amount of urine than normal.  · To prevent dehydration, drink plenty of fluids, enough so that your urine is light yellow or clear like water. Choose water and other caffeine-free clear liquids until you feel better. If you have kidney, heart, or liver disease and have to limit fluids, talk with your doctor before you increase the amount of fluids you drink. · Begin eating small amounts of mild foods the next day, if you feel like it. ¨ Try yogurt that has live cultures of Lactobacillus. (Check the label.)  ¨ Avoid spicy foods, fruits, alcohol, and caffeine until 48 hours after all symptoms are gone. ¨ Avoid chewing gum that contains sorbitol. ¨ Avoid dairy products (except for yogurt with Lactobacillus) while you have diarrhea and for 3 days after symptoms are gone. · The doctor may recommend that you take over-the-counter medicine, such as loperamide (Imodium), if you still have diarrhea after 6 hours. Read and follow all instructions on the label. Do not use this medicine if you have bloody diarrhea, a high fever, or other signs of serious illness. Call your doctor if you think you are having a problem with your medicine. When should you call for help? Call 911 anytime you think you may need emergency care. For example, call if:  · You passed out (lost consciousness). · Your stools are maroon or very bloody. Call your doctor now or seek immediate medical care if:  · You are dizzy or lightheaded, or you feel like you may faint. · Your stools are black and look like tar, or they have streaks of blood. · You have new or worse belly pain. · You have symptoms of dehydration, such as:  ¨ Dry eyes and a dry mouth. ¨ Passing only a little dark urine. ¨ Feeling thirstier than usual.  · You have a new or higher fever. Watch closely for changes in your health, and be sure to contact your doctor if:  · Your diarrhea is getting worse.   · You see pus in the diarrhea. · You are not getting better after 2 days (48 hours). Where can you learn more? Go to http://bello-alejandro.info/. Enter K294 in the search box to learn more about \"Diarrhea: Care Instructions. \"  Current as of: March 20, 2017  Content Version: 11.3  © 9747-6057 [employer]. Care instructions adapted under license by [employer] (which disclaims liability or warranty for this information). If you have questions about a medical condition or this instruction, always ask your healthcare professional. [place] [address] any warranty or liability for your use of this information.

## 2017-08-16 LAB
BASOPHILS # BLD AUTO: 0 X10E3/UL (ref 0–0.2)
BASOPHILS NFR BLD AUTO: 1 %
BUN SERPL-MCNC: 13 MG/DL (ref 6–24)
BUN/CREAT SERPL: 15 (ref 9–23)
CALCIUM SERPL-MCNC: 9.7 MG/DL (ref 8.7–10.2)
CHLORIDE SERPL-SCNC: 101 MMOL/L (ref 96–106)
CO2 SERPL-SCNC: 25 MMOL/L (ref 18–29)
CREAT SERPL-MCNC: 0.84 MG/DL (ref 0.57–1)
EOSINOPHIL # BLD AUTO: 0 X10E3/UL (ref 0–0.4)
EOSINOPHIL NFR BLD AUTO: 1 %
ERYTHROCYTE [DISTWIDTH] IN BLOOD BY AUTOMATED COUNT: 13.7 % (ref 12.3–15.4)
GLUCOSE SERPL-MCNC: 89 MG/DL (ref 65–99)
HCT VFR BLD AUTO: 48.7 % (ref 34–46.6)
HGB BLD-MCNC: 15.7 G/DL (ref 11.1–15.9)
IMM GRANULOCYTES # BLD: 0 X10E3/UL (ref 0–0.1)
IMM GRANULOCYTES NFR BLD: 0 %
LYMPHOCYTES # BLD AUTO: 1.1 X10E3/UL (ref 0.7–3.1)
LYMPHOCYTES NFR BLD AUTO: 15 %
MAGNESIUM SERPL-MCNC: 2.4 MG/DL (ref 1.6–2.3)
MCH RBC QN AUTO: 30 PG (ref 26.6–33)
MCHC RBC AUTO-ENTMCNC: 32.2 G/DL (ref 31.5–35.7)
MCV RBC AUTO: 93 FL (ref 79–97)
MONOCYTES # BLD AUTO: 0.5 X10E3/UL (ref 0.1–0.9)
MONOCYTES NFR BLD AUTO: 7 %
NEUTROPHILS # BLD AUTO: 5.3 X10E3/UL (ref 1.4–7)
NEUTROPHILS NFR BLD AUTO: 76 %
PLATELET # BLD AUTO: 258 X10E3/UL (ref 150–379)
POTASSIUM SERPL-SCNC: 5.1 MMOL/L (ref 3.5–5.2)
RBC # BLD AUTO: 5.23 X10E6/UL (ref 3.77–5.28)
SODIUM SERPL-SCNC: 143 MMOL/L (ref 134–144)
WBC # BLD AUTO: 7 X10E3/UL (ref 3.4–10.8)

## 2017-08-18 ENCOUNTER — TELEPHONE (OUTPATIENT)
Dept: INTERNAL MEDICINE CLINIC | Age: 62
End: 2017-08-18

## 2017-08-18 NOTE — PROGRESS NOTES
Labs normal. No significant dehydration. Stool tests not back how is she feeling? Remind her to sign up for MyChart.

## 2017-08-20 LAB
CAMPYLOBACTER STL CULT: NORMAL
E COLI SXT STL QL IA: NEGATIVE
SALM + SHIG STL CULT: NORMAL

## 2017-08-21 ENCOUNTER — TELEPHONE (OUTPATIENT)
Dept: INTERNAL MEDICINE CLINIC | Age: 62
End: 2017-08-21

## 2017-08-21 NOTE — TELEPHONE ENCOUNTER
Patient has been informed per per drs lab results and recommendations, remaining stool testing still pending, patient inquiry wether or not elevated magnesium could be a problem. She had been taking magnesium per orthopedic MD orders , but stopped in July when diarhea started. She had diarrhea yesterday once only.

## 2017-08-21 NOTE — TELEPHONE ENCOUNTER
----- Message from Darren Weston MD sent at 8/18/2017  4:38 PM EDT -----  Labs normal. No significant dehydration. Stool tests not back how is she feeling? Remind her to sign up for MyChart.

## 2017-08-23 ENCOUNTER — TELEPHONE (OUTPATIENT)
Dept: INTERNAL MEDICINE CLINIC | Age: 62
End: 2017-08-23

## 2017-08-23 DIAGNOSIS — R19.7 DIARRHEA, UNSPECIFIED TYPE: Primary | ICD-10-CM

## 2017-08-23 DIAGNOSIS — R10.12 LEFT UPPER QUADRANT PAIN: ICD-10-CM

## 2017-08-23 NOTE — TELEPHONE ENCOUNTER
Patient states she was getting better, but diarrhea started again 2 days ago along with some discomfort in her chest, some nausea today as well. Brigitte Diana was contacted and said the rest of her stool tests will be back tomorrow.

## 2017-08-24 ENCOUNTER — TELEPHONE (OUTPATIENT)
Dept: INTERNAL MEDICINE CLINIC | Age: 62
End: 2017-08-24

## 2017-08-24 LAB — O+P SPEC MICRO: NORMAL

## 2017-08-24 RX ORDER — RANITIDINE 300 MG/1
300 TABLET ORAL DAILY
Qty: 30 TAB | Refills: 0 | Status: SHIPPED | OUTPATIENT
Start: 2017-08-24 | End: 2018-04-11 | Stop reason: ALTCHOICE

## 2017-08-24 NOTE — TELEPHONE ENCOUNTER
Pt will be traveling tomorrow morning (7 am) and would like to know her stool results; does she need to come in today for reevaluation? She continues to have pain and discomfort.

## 2017-08-24 NOTE — TELEPHONE ENCOUNTER
Spoke Raffy Montes she is still having intermittent stool and LUQ pain. Early satiety. Did improve with azithromycin. But diarrhea has occurred last episode formed stool this AM.   +LUQ pain today.    Preferred number  [phone]

## 2017-08-25 ENCOUNTER — TELEPHONE (OUTPATIENT)
Dept: INTERNAL MEDICINE CLINIC | Age: 62
End: 2017-08-25

## 2017-08-25 NOTE — TELEPHONE ENCOUNTER
Patient has been scheduled with Dr Mckenzie Albert on 08/31/17 at 8:30. Pt must also call to pre-register [phone] , location [address] [place] [address] [address] [place] [address], [employer] inform of their office cancelation policy < 48 hrs. Dr Braden Yancey contact # is [phone].

## 2017-08-28 ENCOUNTER — TELEPHONE (OUTPATIENT)
Dept: INTERNAL MEDICINE CLINIC | Age: 62
End: 2017-08-28

## 2017-08-28 LAB
SPECIMEN STATUS REPORT, ROLRST: NORMAL
WBC STL QL MICRO: NORMAL

## 2017-08-28 NOTE — TELEPHONE ENCOUNTER
Left detailed message with time and place of apt. Advised pt to return our call to confirm voice mail.

## 2017-08-28 NOTE — TELEPHONE ENCOUNTER
[phone] cell The patient was told a appt with a GI Dr would be made for today.  She would like to know the time and place

## 2017-08-29 NOTE — PROGRESS NOTES
Lab results faxed to Dr. Guanaco Silver prior scheduled visit on 8/31/17 to: [phone]. LVM for pt to return our call and discuss lab results.

## 2017-08-29 NOTE — PROGRESS NOTES
Pt made aware of lab results and scheduled apt with Dr. Rama Goldman on 8/31 with time date and address. Pt verbalized understanding.

## 2017-08-29 NOTE — PROGRESS NOTES
Please let Izabella Tucson know stool white blood cell count normal. See Dr. Ivan Farmer as scheduled. Please fax results to Dr. Alexus Medina office.

## 2018-02-06 ENCOUNTER — TELEPHONE (OUTPATIENT)
Dept: INTERNAL MEDICINE CLINIC | Age: 63
End: 2018-02-06

## 2018-02-06 DIAGNOSIS — E03.9 ACQUIRED HYPOTHYROIDISM: Primary | ICD-10-CM

## 2018-03-01 LAB
FT4I SERPL CALC-MCNC: 1.8 (ref 1.2–4.9)
T3RU NFR SERPL: 27 % (ref 24–39)
T4 SERPL-MCNC: 6.6 UG/DL (ref 4.5–12)
TSH SERPL DL<=0.005 MIU/L-ACNC: 2.29 UIU/ML (ref 0.45–4.5)

## 2018-04-11 ENCOUNTER — OFFICE VISIT (OUTPATIENT)
Dept: INTERNAL MEDICINE CLINIC | Age: 63
End: 2018-04-11

## 2018-04-11 VITALS
HEART RATE: 72 BPM | BODY MASS INDEX: 28.22 KG/M2 | OXYGEN SATURATION: 96 % | DIASTOLIC BLOOD PRESSURE: 82 MMHG | RESPIRATION RATE: 14 BRPM | TEMPERATURE: 98.8 F | SYSTOLIC BLOOD PRESSURE: 112 MMHG | WEIGHT: 175.6 LBS | HEIGHT: 66 IN

## 2018-04-11 DIAGNOSIS — H60.8X3 CHRONIC ECZEMATOUS OTITIS EXTERNA OF BOTH EARS: ICD-10-CM

## 2018-04-11 DIAGNOSIS — K21.9 GASTROESOPHAGEAL REFLUX DISEASE, ESOPHAGITIS PRESENCE NOT SPECIFIED: ICD-10-CM

## 2018-04-11 DIAGNOSIS — E78.49 OTHER HYPERLIPIDEMIA: Primary | ICD-10-CM

## 2018-04-11 DIAGNOSIS — L60.0 INGROWN NAIL OF GREAT TOE OF LEFT FOOT: ICD-10-CM

## 2018-04-11 DIAGNOSIS — F32.9 REACTIVE DEPRESSION: ICD-10-CM

## 2018-04-11 DIAGNOSIS — E03.9 ACQUIRED HYPOTHYROIDISM: ICD-10-CM

## 2018-04-11 DIAGNOSIS — Z00.00 ROUTINE GENERAL MEDICAL EXAMINATION AT A HEALTH CARE FACILITY: ICD-10-CM

## 2018-04-11 RX ORDER — OMEPRAZOLE 20 MG/1
20 CAPSULE, DELAYED RELEASE ORAL DAILY
COMMUNITY

## 2018-04-11 RX ORDER — FLUOXETINE HYDROCHLORIDE 40 MG/1
40 CAPSULE ORAL DAILY
Qty: 90 CAP | Refills: 3 | Status: SHIPPED | OUTPATIENT
Start: 2018-04-11 | End: 2019-04-30 | Stop reason: SDUPTHER

## 2018-04-11 RX ORDER — LEVOTHYROXINE SODIUM 88 UG/1
TABLET ORAL
Qty: 90 TAB | Refills: 1 | Status: SHIPPED | OUTPATIENT
Start: 2018-04-11 | End: 2019-02-06 | Stop reason: SDUPTHER

## 2018-04-11 NOTE — PROGRESS NOTES
HISTORY OF PRESENT ILLNESS  Janelle Winslow is a 63 y.o. female. HPI  Cardiovascular Review:  The patient has hyperlipidemia and obesity Body mass index is 28.34 kg/(m^2). Diet and Lifestyle: exercises regularly, nonsmoker, restarted exercise 2 weeks ago. Resumed healthy eating. Home BP Monitoring: is not measured at home. Pertinent ROS: no TIA's, no chest pain on exertion, no dyspnea on exertion, no swelling of ankles. GERD  Started 1 year ago after trip to [place]. Saw Dr. Kenrick Mcnamara. EGD and hy pylori wnl. Her symptoms have significantly improved. Only on prilosec now. No weight loss or hematochezia. Depression  Patient is seen for followup of depression. Treatment includes Prozac and no other therapies. she denies depressed mood, suicidal thoughts without plan and suicidal thoughts with specific plan. she experiences the following side effects from the treatment: none. Ingrown Toe nail  Started weeks ago. Worsening. +TTP   Denies redness or purulent discharge. No fevers, chills. Review of Systems   Constitutional: Negative for diaphoresis, fever and weight loss. HENT:        Dry ears improved    Eyes: Negative for blurred vision and pain. Respiratory: Negative for shortness of breath. Cardiovascular: Negative for chest pain, orthopnea and leg swelling. Dr. Shannon Pichardo has removed her varicose veins. Skin:        Hang nail in LLE great toe    Neurological: Negative for focal weakness and headaches. Psychiatric/Behavioral: Negative for depression. Patient Active Problem List    Diagnosis Date Noted    HLD (hyperlipidemia) 06/15/2016    Acquired hypothyroidism 06/15/2016    Reactive depression 06/15/2016    Sinus problem        Current Outpatient Prescriptions   Medication Sig Dispense Refill    omeprazole (PRILOSEC) 20 mg capsule Take 20 mg by mouth daily.       SYNTHROID 88 mcg tablet TAKE 1 TABLET BY MOUTH DAILY BEFORE BREAKFAST 90 Tab 0    FLUoxetine (PROZAC) 40 mg capsule Take 1 Cap by mouth daily. 90 Cap 3    mometasone (NASONEX) 50 mcg/actuation nasal spray 2 sprays daily.  Azelastine (ASTEPRO) 0.15 % (205.5 mcg) nasal spray two (2) times a day. Allergies   Allergen Reactions    Codeine Unknown (comments)    Sudafed [Pseudoephedrine Hcl] Palpitations    Sulfa (Sulfonamide Antibiotics) Itching      Visit Vitals    /82 (BP 1 Location: Right arm, BP Patient Position: Sitting)    Pulse 72    Temp 98.8 °F (37.1 °C) (Oral)    Resp 14    Ht 5' 6\" (1.676 m)    Wt 175 lb 9.6 oz (79.7 kg)    SpO2 96%    BMI 28.34 kg/m2       Physical Exam   Constitutional: She is oriented to person, place, and time. She appears well-developed. No distress. Eyes: Conjunctivae are normal.   Neck: Neck supple. No thyromegaly present. Cardiovascular: Normal rate, regular rhythm and normal heart sounds. Pulmonary/Chest: Effort normal and breath sounds normal. No respiratory distress. She has no wheezes. She has no rales. She exhibits no tenderness. Abdominal: Bowel sounds are normal. She exhibits no distension. There is no tenderness. There is no rebound. Musculoskeletal:        Feet:    Lymphadenopathy:     She has no cervical adenopathy. Neurological: She is alert and oriented to person, place, and time. Skin: Skin is warm. Psychiatric: She has a normal mood and affect.      Lab Results  Component Value Date/Time   WBC 7.0 08/15/2017 12:00 AM   HGB 15.7 08/15/2017 12:00 AM   HCT 48.7 (H) 08/15/2017 12:00 AM   PLATELET 137 78/40/1899 12:00 AM   MCV 93 08/15/2017 12:00 AM     Lab Results  Component Value Date/Time   Hemoglobin A1c 6.1 (H) 01/13/2017 10:22 AM   Hemoglobin A1c 6.0 (H) 10/05/2016 09:51 AM   Glucose 89 08/15/2017 12:00 AM   LDL, calculated 172 (H) 01/13/2017 10:22 AM   Creatinine 0.84 08/15/2017 12:00 AM      Lab Results  Component Value Date/Time   Cholesterol, total 266 (H) 01/13/2017 10:22 AM   HDL Cholesterol 62 01/13/2017 10:22 AM LDL, calculated 172 (H) 01/13/2017 10:22 AM   Triglyceride 160 (H) 01/13/2017 10:22 AM     Lab Results  Component Value Date/Time   ALT (SGPT) 14 01/13/2017 10:22 AM   AST (SGOT) 19 01/13/2017 10:22 AM   Alk. phosphatase 76 01/13/2017 10:22 AM   Bilirubin, total 0.3 01/13/2017 10:22 AM   Albumin 4.6 01/13/2017 10:22 AM   Protein, total 7.0 01/13/2017 10:22 AM   PLATELET 316 66/00/2341 12:00 AM       Lab Results  Component Value Date/Time   GFR est non-AA 76 08/15/2017 12:00 AM   GFR est AA 88 08/15/2017 12:00 AM   Creatinine 0.84 08/15/2017 12:00 AM   BUN 13 08/15/2017 12:00 AM   Sodium 143 08/15/2017 12:00 AM   Potassium 5.1 08/15/2017 12:00 AM   Chloride 101 08/15/2017 12:00 AM   CO2 25 08/15/2017 12:00 AM   Magnesium 2.4 (H) 08/15/2017 12:00 AM     Lab Results  Component Value Date/Time   TSH 2.290 02/28/2018 01:35 PM   T3 Uptake 27 02/28/2018 01:35 PM   T4, Total 6.6 02/28/2018 01:35 PM      Lab Results   Component Value Date/Time    Glucose 89 08/15/2017 12:00 AM         ASSESSMENT and PLAN  Diagnoses and all orders for this visit:    1. Other hyperlipidemia- check lipids. 2. Gastroesophageal reflux disease, esophagitis presence not specified-improved. Taper prilosec. Follow GERD diet     3. Acquired hypothyroidism- recent TFTs at goal. Check in 6 months given weight loss plan. 4. Reactive depression- stable. Patient Education:  Reviewed concept of depression as biochemical imbalance of neurotransmitters and rationale for treatment. Instructed patient to contact office or 911 promptly should condition worsen or any new symptoms appear and provided on-call telephone numbers. IF THE PATIENT HAS ANY SUICIDAL OR HOMICIDAL IDEATION, CALL THE OFFICE, DISCUSS WITH A SUPPORT MEMBER OR GO TO THE ER IMMEDIATELY. Patient was agreeable with this      5. Chronic eczematous otitis externa of both ears- followed annually by ENT     6. Routine general medical examination at a health care facility- labs ordered.  DAVID updated. -     LIPID PANEL  -     METABOLIC PANEL, COMPREHENSIVE  -     CBC WITH AUTOMATED DIFF  -     TSH 3RD GENERATION  -     VITAMIN D, 25 HYDROXY  -     HEMOGLOBIN A1C WITH EAG    7. Ingrown nail of great toe of left foot- no e/o infection. Podiatry referral given for management       Follow-up Disposition:  Return in about 6 months (around 10/11/2018) for Physical - 30 minute appointment. Medication risks/benefits/costs/interactions/alternatives discussed with patient. Izabella Anaya  was given an after visit summary which includes diagnoses, current medications, & vitals. she expressed understanding with the diagnosis and plan.

## 2018-04-11 NOTE — MR AVS SNAPSHOT
[address] 
[phone] Patient: Tarun Monge MRN: [id number] ATT:14/33/1115 Visit Information Date & Time Provider Department Dept. Phone Encounter #  
 4/11/2018  9:30 AM Carole Koyanagi, MD [address] [place] [address] Internal Medicine [phone] [id number] Follow-up Instructions Return in about 6 months (around 10/11/2018) for Physical - 30 minute appointment. Upcoming Health Maintenance Date Due Influenza Age 5 to Adult 9/1/2018* PAP AKA CERVICAL CYTOLOGY 5/1/2019 BREAST CANCER SCRN MAMMOGRAM 5/25/2019 COLONOSCOPY 1/11/2022 DTaP/Tdap/Td series (2 - Td) 5/27/2022 *Topic was postponed. The date shown is not the original due date. Allergies as of 4/11/2018  Review Complete On: 4/11/2018 By: Carole Koyanagi, MD  
  
 Severity Noted Reaction Type Reactions Codeine  11/24/2014   Not Verified Unknown (comments) Sudafed [Pseudoephedrine Hcl]  11/24/2014   Side Effect Palpitations Sulfa (Sulfonamide Antibiotics)  11/24/2014   Systemic Itching Current Immunizations  Never Reviewed Name Date Tdap 5/27/2012 Not reviewed this visit You Were Diagnosed With   
  
 Codes Comments Other hyperlipidemia    -  Primary ICD-10-CM: E78.4 ICD-9-CM: 272.4 Gastroesophageal reflux disease, esophagitis presence not specified     ICD-10-CM: K21.9 ICD-9-CM: 530.81 Acquired hypothyroidism     ICD-10-CM: E03.9 ICD-9-CM: 244.9 Reactive depression     ICD-10-CM: F32.9 ICD-9-CM: 300.4 Chronic eczematous otitis externa of both ears     ICD-10-CM: H60.8X3 ICD-9-CM: 380.23 Routine general medical examination at a health care facility     ICD-10-CM: Z00.00 ICD-9-CM: V70.0 Vitals BP Pulse Temp Resp Height(growth percentile) Weight(growth percentile)  112/82 (BP 1 Location: Right arm, BP Patient Position: Sitting) 72 98.8 °F (37.1 °C) (Oral) 14 5' 6\" (1.676 m) 175 lb 9.6 oz (79.7 kg) SpO2 BMI OB Status Smoking Status 96% 28.34 kg/m2 Unknown Never Smoker Vitals History BMI and BSA Data Body Mass Index Body Surface Area  
 28.34 kg/m 2 1.93 m 2 Preferred Pharmacy Pharmacy Name Phone [id number] [address] [phone] Your Updated Medication List  
  
   
This list is accurate as of 4/11/18 10:37 AM.  Always use your most recent med list.  
  
  
  
  
 ASTEPRO 0.15 % (205.5 mcg) nasal spray Generic drug:  Azelastine  
two (2) times a day. FLUoxetine 40 mg capsule Commonly known as:  PROzac Take 1 Cap by mouth daily. NASONEX 50 mcg/actuation nasal spray Generic drug:  mometasone 2 sprays daily. omeprazole 20 mg capsule Commonly known as:  PRILOSEC Take 20 mg by mouth daily. SYNTHROID 88 mcg tablet Generic drug:  levothyroxine TAKE 1 TABLET BY MOUTH DAILY BEFORE BREAKFAST We Performed the Following CBC WITH AUTOMATED DIFF [37588 CPT(R)] HEMOGLOBIN A1C WITH EAG [15990 CPT(R)] LIPID PANEL [05881 CPT(R)] METABOLIC PANEL, COMPREHENSIVE [12698 CPT(R)] TSH 3RD GENERATION [28214 CPT(R)] VITAMIN D, 25 HYDROXY [id number] CPT(R)] Follow-up Instructions Return in about 6 months (around 10/11/2018) for Physical - 30 minute appointment. Patient Instructions It was a pleasure to see you! As discussed: 
 
Health Maintenance I have ordered your age appropriate labs please complete them. You will need to fast 10-12hrs before your appointment. Great job on [employer] and exercising! Remember goal for exercise is 150minutes of moderate exercise a week and diet goal is to eat 50% fruits and vegetables with minimal sugar, fat and oil daily. See health.gov or choosemyplate.gov for more details.   
Your cervical cancer and breast cancer screening will be completed by your ob/ gyn as scheduled. Prilosec Taper Take one tablet: 
Week 1: Every other days Week 2: Every 2 pdays THEN Stop Follow GERD diet Ingrown Toenail: Care Instructions Your Care Instructions An ingrown toenail often occurs because a nail is not trimmed correctly or because shoes are too tight. An ingrown nail can cause an infection. If your toe is infected, your doctor may prescribe antibiotics. Most ingrown toenails can be treated at home. You should trim toenails straight across, so the ends of the nail grow over the skin and not into it. Good nail care can prevent ingrown toenails. Follow-up care is a key part of your treatment and safety. Be sure to make and go to all appointments, and call your doctor if you are having problems. It's also a good idea to know your test results and keep a list of the medicines you take. How can you care for yourself at home? · Trim the nails straight across. Leave the corners a little longer so they do not cut into the skin. To do this when you have an ingrown nail: 
¨ Soak your foot in warm water for about 15 minutes to soften the nail. ¨ Wedge a small piece of wet cotton under the corner of the nail to cushion the nail and lift it slightly. This keeps it from cutting the skin. ¨ Repeat daily until the nail has grown out and can be trimmed. · Do not use manicure scissors to dig under the ingrown nail. You might stab your toe, which could get infected. · Do not trim your toenails too short. · Check with your doctor before trimming your own toenails if you have been diagnosed with diabetes or peripheral arterial disease. These conditions increase the risk of an infection, because you may have decreased sensation in your toes and cut yourself without knowing it. · Wear roomy, comfortable shoes. · If your doctor prescribed antibiotics, take them as directed. Do not stop taking them just because you feel better.  You need to take the full course of antibiotics. When should you call for help? Call your doctor now or seek immediate medical care if: 
? · You have signs of infection, such as: 
¨ Increased pain, swelling, warmth, or redness. ¨ Red streaks leading from the toe. ¨ Pus draining from the toe. ¨ A fever. ? Watch closely for changes in your health, and be sure to contact your doctor if: 
? · You do not get better as expected. Where can you learn more? Go to http://bello-alejandro.info/. Enter R135 in the search box to learn more about \"Ingrown Toenail: Care Instructions. \" Current as of: October 13, 2016 Content Version: 11.4 © 5201-2336 Sens[employer]. Care instructions adapted under license by [employer] (which disclaims liability or warranty for this information). If you have questions about a medical condition or this instruction, always ask your healthcare professional. [place] [address] any warranty or liability for your use of this information. Gastroesophageal Reflux Disease (GERD): Care Instructions Your Care Instructions Gastroesophageal reflux disease (GERD) is the backward flow of stomach acid into the esophagus. The esophagus is the tube that leads from your throat to your stomach. A one-way valve prevents the stomach acid from moving up into this tube. When you have GERD, this valve does not close tightly enough. If you have mild GERD symptoms including heartburn, you may be able to control the problem with antacids or over-the-counter medicine. Changing your diet, losing weight, and making other lifestyle changes can also help reduce symptoms. Follow-up care is a key part of your treatment and safety. Be sure to make and go to all appointments, and call your doctor if you are having problems. It's also a good idea to know your test results and keep a list of the medicines you take. How can you care for yourself at home? · Take your medicines exactly as prescribed. Call your doctor if you think you are having a problem with your medicine. · Your doctor may recommend over-the-counter medicine. For mild or occasional indigestion, antacids, such as Tums, Gaviscon, Mylanta, or Maalox, may help. Your doctor also may recommend over-the-counter acid reducers, such as Pepcid AC, Tagamet HB, Zantac 75, or Prilosec. Read and follow all instructions on the label. If you use these medicines often, talk with your doctor. · Change your eating habits. ¨ It's best to eat several small meals instead of two or three large meals. ¨ After you eat, wait 2 to 3 hours before you lie down. ¨ Chocolate, mint, and alcohol can make GERD worse. ¨ Spicy foods, foods that have a lot of acid (like tomatoes and oranges), and coffee can make GERD symptoms worse in some people. If your symptoms are worse after you eat a certain food, you may want to stop eating that food to see if your symptoms get better. · Do not smoke or chew tobacco. Smoking can make GERD worse. If you need help quitting, talk to your doctor about stop-smoking programs and medicines. These can increase your chances of quitting for good. · If you have GERD symptoms at night, raise the head of your bed 6 to 8 inches by putting the frame on blocks or placing a foam wedge under the head of your mattress. (Adding extra pillows does not work.) · Do not wear tight clothing around your middle. · Lose weight if you need to. Losing just 5 to 10 pounds can help. When should you call for help? Call your doctor now or seek immediate medical care if: 
? · You have new or different belly pain. ? · Your stools are black and tarlike or have streaks of blood. ? Watch closely for changes in your health, and be sure to contact your doctor if: 
? · Your symptoms have not improved after 2 days. ? · Food seems to catch in your throat or chest.  
Where can you learn more? Go to http://bello-alejandro.info/. Enter M955 in the search box to learn more about \"Gastroesophageal Reflux Disease (GERD): Care Instructions. \" Current as of: May 12, 2017 Content Version: 11.4 © 2808-8320 [employer]. Care instructions adapted under license by [employer] (which disclaims liability or warranty for this information). If you have questions about a medical condition or this instruction, always ask your healthcare professional. [address] any warranty or liability for your use of this information. Introducing [facility]! [facility] introduces PeerMe patient portal. Now you can access parts of your medical record, email your doctor's office, and request medication refills online. 1. In your internet browser, go to [email or web address]://[email or web address] Mobile. "[employer]/Cascada Mobile 2. Click on the First Time User? Click Here link in the Sign In box. You will see the New Member Sign Up page. 3. Enter your PeerMe Access Code exactly as it appears below. You will not need to use this code after youve completed the sign-up process. If you do not sign up before the expiration date, you must request a new code. · PeerMe Access Code: [email or web address] Expires: 7/10/2018 10:37 AM 
 
4. Enter the last four digits of your Social Security Number (xxxx) and Date of Birth (mm/dd/yyyy) as indicated and click Submit. You will be taken to the next sign-up page. 5. Create a Cuculust ID. This will be your PeerMe login ID and cannot be changed, so think of one that is secure and easy to remember. 6. Create a PeerMe password. You can change your password at any time. 7. Enter your Password Reset Question and Answer. This can be used at a later time if you forget your password. 8. Enter your e-mail address. You will receive e-mail notification when new information is available in [address] 9. Click Sign Up. You can now view and download portions of your medical record. 10. Click the Download Summary menu link to download a portable copy of your medical information. If you have questions, please visit the Frequently Asked Questions section of the Adzerk website. Remember, Adzerk is NOT to be used for urgent needs. For medical emergencies, dial 911. Now available from your iPhone and Android! Please provide this summary of care documentation to your next provider. Your primary care clinician is listed as Gelacio Lynch. If you have any questions after today's visit, please call [phone].

## 2018-04-11 NOTE — PATIENT INSTRUCTIONS
It was a pleasure to see you! As discussed:    Health Maintenance   I have ordered your age appropriate labs please complete them. You will need to fast 10-12hrs before your appointment. Great job on [employer] and exercising! Remember goal for exercise is 150minutes of moderate exercise a week and diet goal is to eat 50% fruits and vegetables with minimal sugar, fat and oil daily. See health.gov or choosemyplate.gov for more details. Your cervical cancer and breast cancer screening will be completed by your ob/ gyn as scheduled. Prilosec Taper  Take one tablet:  Week 1: Every other days  Week 2: Every 2 pdays THEN Stop  Follow GERD diet       Ingrown Toenail: Care Instructions  Your Care Instructions    An ingrown toenail often occurs because a nail is not trimmed correctly or because shoes are too tight. An ingrown nail can cause an infection. If your toe is infected, your doctor may prescribe antibiotics. Most ingrown toenails can be treated at home. You should trim toenails straight across, so the ends of the nail grow over the skin and not into it. Good nail care can prevent ingrown toenails. Follow-up care is a key part of your treatment and safety. Be sure to make and go to all appointments, and call your doctor if you are having problems. It's also a good idea to know your test results and keep a list of the medicines you take. How can you care for yourself at home? · Trim the nails straight across. Leave the corners a little longer so they do not cut into the skin. To do this when you have an ingrown nail:  ¨ Soak your foot in warm water for about 15 minutes to soften the nail. ¨ Wedge a small piece of wet cotton under the corner of the nail to cushion the nail and lift it slightly. This keeps it from cutting the skin. ¨ Repeat daily until the nail has grown out and can be trimmed. · Do not use manicure scissors to dig under the ingrown nail.  You might stab your toe, which could get infected. · Do not trim your toenails too short. · Check with your doctor before trimming your own toenails if you have been diagnosed with diabetes or peripheral arterial disease. These conditions increase the risk of an infection, because you may have decreased sensation in your toes and cut yourself without knowing it. · Wear roomy, comfortable shoes. · If your doctor prescribed antibiotics, take them as directed. Do not stop taking them just because you feel better. You need to take the full course of antibiotics. When should you call for help? Call your doctor now or seek immediate medical care if:  ? · You have signs of infection, such as:  ¨ Increased pain, swelling, warmth, or redness. ¨ Red streaks leading from the toe. ¨ Pus draining from the toe. ¨ A fever. ? Watch closely for changes in your health, and be sure to contact your doctor if:  ? · You do not get better as expected. Where can you learn more? Go to http://belloOrgooalejandro.info/. Enter R135 in the search box to learn more about \"Ingrown Toenail: Care Instructions. \"  Current as of: October 13, 2016  Content Version: 11.4  © 7629-4322 [employer]. Care instructions adapted under license by [employer] (which disclaims liability or warranty for this information). If you have questions about a medical condition or this instruction, always ask your healthcare professional. [place] [address] any warranty or liability for your use of this information. Gastroesophageal Reflux Disease (GERD): Care Instructions  Your Care Instructions    Gastroesophageal reflux disease (GERD) is the backward flow of stomach acid into the esophagus. The esophagus is the tube that leads from your throat to your stomach. A one-way valve prevents the stomach acid from moving up into this tube. When you have GERD, this valve does not close tightly enough.   If you have mild GERD symptoms including heartburn, you may be able to control the problem with antacids or over-the-counter medicine. Changing your diet, losing weight, and making other lifestyle changes can also help reduce symptoms. Follow-up care is a key part of your treatment and safety. Be sure to make and go to all appointments, and call your doctor if you are having problems. It's also a good idea to know your test results and keep a list of the medicines you take. How can you care for yourself at home? · Take your medicines exactly as prescribed. Call your doctor if you think you are having a problem with your medicine. · Your doctor may recommend over-the-counter medicine. For mild or occasional indigestion, antacids, such as Tums, Gaviscon, Mylanta, or Maalox, may help. Your doctor also may recommend over-the-counter acid reducers, such as Pepcid AC, Tagamet HB, Zantac 75, or Prilosec. Read and follow all instructions on the label. If you use these medicines often, talk with your doctor. · Change your eating habits. ¨ It's best to eat several small meals instead of two or three large meals. ¨ After you eat, wait 2 to 3 hours before you lie down. ¨ Chocolate, mint, and alcohol can make GERD worse. ¨ Spicy foods, foods that have a lot of acid (like tomatoes and oranges), and coffee can make GERD symptoms worse in some people. If your symptoms are worse after you eat a certain food, you may want to stop eating that food to see if your symptoms get better. · Do not smoke or chew tobacco. Smoking can make GERD worse. If you need help quitting, talk to your doctor about stop-smoking programs and medicines. These can increase your chances of quitting for good. · If you have GERD symptoms at night, raise the head of your bed 6 to 8 inches by putting the frame on blocks or placing a foam wedge under the head of your mattress. (Adding extra pillows does not work.)  · Do not wear tight clothing around your middle. · Lose weight if you need to. Losing just 5 to 10 pounds can help. When should you call for help? Call your doctor now or seek immediate medical care if:  ? · You have new or different belly pain. ? · Your stools are black and tarlike or have streaks of blood. ? Watch closely for changes in your health, and be sure to contact your doctor if:  ? · Your symptoms have not improved after 2 days. ? · Food seems to catch in your throat or chest.   Where can you learn more? Go to http://bello-alejandro.info/. Enter F168 in the search box to learn more about \"Gastroesophageal Reflux Disease (GERD): Care Instructions. \"  Current as of: May 12, 2017  Content Version: 11.4  © 0459-1751 Healthwise, Incorporated. Care instructions adapted under license by [employer] (which disclaims liability or warranty for this information). If you have questions about a medical condition or this instruction, always ask your healthcare professional. [place] [address] any warranty or liability for your use of this information.

## 2018-04-11 NOTE — PROGRESS NOTES
Follow up on hypothyroidism  Hangnail left big toe  Ears itching - states she will make appointment with ENT

## 2018-05-05 DIAGNOSIS — E03.9 ACQUIRED HYPOTHYROIDISM: ICD-10-CM

## 2018-05-07 RX ORDER — LEVOTHYROXINE SODIUM 88 UG/1
TABLET ORAL
Qty: 90 TAB | Refills: 0 | Status: SHIPPED | OUTPATIENT
Start: 2018-05-07 | End: 2019-04-24

## 2019-02-06 DIAGNOSIS — E03.9 ACQUIRED HYPOTHYROIDISM: ICD-10-CM

## 2019-02-07 RX ORDER — LEVOTHYROXINE SODIUM 88 UG/1
TABLET ORAL
Qty: 90 TAB | Refills: 1 | Status: SHIPPED | OUTPATIENT
Start: 2019-02-07 | End: 2019-05-09 | Stop reason: SDUPTHER

## 2019-04-23 NOTE — PROGRESS NOTES
Identified pt with two pt identifiers(name and ). Reviewed record in preparation for visit and have obtained necessary documentation. All patient medications has been reviewed. Chief Complaint Patient presents with  Thyroid Problem Health Maintenance Due Topic  Shingrix Vaccine Age 50> (1 of 2)  PAP AKA CERVICAL CYTOLOGY  BREAST CANCER SCRN MAMMOGRAM   
 
Due in may: mammo Vitals:  
 19 1009 BP: 114/66 Pulse: 67 Resp: 16 Temp: 98.2 °F (36.8 °C) TempSrc: Oral  
SpO2: 96% Weight: 177 lb 6.4 oz (80.5 kg) Height: 5' 6\" (1.676 m) PainSc:   0 - No pain Coordination of Care Questionnaire: 
:  
1) Have you been to an emergency room, urgent care, or hospitalized since your last visit?   no    
 
2. Have seen or consulted any other health care provider since your last visit? PT for jaw PAIN  OCT-FEB 3) Do you have an Advanced Directive/ Living Will in place? YES If yes, do we have a copy on file NO If no, would you like information NO Patient is accompanied by self I have received verbal consent from [facility] to discuss any/all medical information while they are present in the room.

## 2019-04-24 ENCOUNTER — OFFICE VISIT (OUTPATIENT)
Dept: INTERNAL MEDICINE CLINIC | Age: 64
End: 2019-04-24

## 2019-04-24 VITALS
HEART RATE: 67 BPM | HEIGHT: 66 IN | WEIGHT: 177.4 LBS | DIASTOLIC BLOOD PRESSURE: 66 MMHG | SYSTOLIC BLOOD PRESSURE: 114 MMHG | OXYGEN SATURATION: 96 % | TEMPERATURE: 98.2 F | RESPIRATION RATE: 16 BRPM | BODY MASS INDEX: 28.51 KG/M2

## 2019-04-24 DIAGNOSIS — E03.9 ACQUIRED HYPOTHYROIDISM: ICD-10-CM

## 2019-04-24 DIAGNOSIS — F32.9 REACTIVE DEPRESSION: ICD-10-CM

## 2019-04-24 DIAGNOSIS — Z00.00 WELL WOMAN EXAM (NO GYNECOLOGICAL EXAM): Primary | ICD-10-CM

## 2019-04-24 RX ORDER — LEVOTHYROXINE SODIUM 88 UG/1
TABLET ORAL
Qty: 90 TAB | Refills: 1 | Status: CANCELLED | OUTPATIENT
Start: 2019-04-24

## 2019-04-24 RX ORDER — DICYCLOMINE HYDROCHLORIDE 10 MG/1
CAPSULE ORAL
COMMUNITY

## 2019-04-24 NOTE — PATIENT INSTRUCTIONS
It was a pleasure to see you! As discussed: 
 
Health Maintenance I have ordered your age appropriate labs please complete them. You will need to fast 10-12hrs before your appointment. Great job on [employer] and exercising! Remember goal for exercise is 150minutes of moderate exercise a week and diet goal is to eat 50% fruits and vegetables with minimal sugar, fat and oil daily. See health.gov or choosemyplate.gov for more details. Your cervical cancer and breast cancer screening will be completed by your ob/ gyn as scheduled. How to break your sugar addiction in 10 days via [facility]  
[email or web address] 
 
  
Well Visit, Women 50 to 72: Care Instructions Your Care Instructions Physical exams can help you stay healthy. Your doctor has checked your overall health and may have suggested ways to take good care of yourself. He or she also may have recommended tests. At home, you can help prevent illness with healthy eating, regular exercise, and other steps. Follow-up care is a key part of your treatment and safety. Be sure to make and go to all appointments, and call your doctor if you are having problems. It's also a good idea to know your test results and keep a list of the medicines you take. How can you care for yourself at home? · Reach and stay at a healthy weight. This will lower your risk for many problems, such as obesity, diabetes, heart disease, and high blood pressure. · Get at least 30 minutes of exercise on most days of the week. Walking is a good choice. You also may want to do other activities, such as running, swimming, cycling, or playing tennis or team sports. · Do not smoke. Smoking can make health problems worse. If you need help quitting, talk to your doctor about stop-smoking programs and medicines. These can increase your chances of quitting for good. · Protect your skin from too much sun. When you're outdoors from 10 a.m. to 4 p.m., stay in the shade or cover up with clothing and a hat with a wide brim. Wear sunglasses that block UV rays. Even when it's cloudy, put broad-spectrum sunscreen (SPF 30 or higher) on any exposed skin. · See a dentist one or two times a year for checkups and to have your teeth cleaned. · Wear a seat belt in the car. · Limit alcohol to 1 drink a day. Too much alcohol can cause health problems. Follow your doctor's advice about when to have certain tests. These tests can spot problems early. · Cholesterol. Your doctor will tell you how often to have this done based on your age, family history, or other things that can increase your risk for heart attack and stroke. · Blood pressure. Have your blood pressure checked during a routine doctor visit. Your doctor will tell you how often to check your blood pressure based on your age, your blood pressure results, and other factors. · Mammogram. Ask your doctor how often you should have a mammogram, which is an X-ray of your breasts. A mammogram can spot breast cancer before it can be felt and when it is easiest to treat. · Pap test and pelvic exam. Ask your doctor how often you should have a Pap test. You may not need to have a Pap test as often as you used to. · Vision. Have your eyes checked every year or two or as often as your doctor suggests. Some experts recommend that you have yearly exams for glaucoma and other age-related eye problems starting at age 48. · Hearing. Tell your doctor if you notice any change in your hearing. You can have tests to find out how well you hear. · Diabetes. Ask your doctor whether you should have tests for diabetes. · Colon cancer. You should begin tests for colon cancer at age 48. You may have one of several tests. Your doctor will tell you how often to have tests based on your age and risk.  Risks include whether you already had a precancerous polyp removed from your colon or whether your parents, sisters and brothers, or children have had colon cancer. · Thyroid disease. Talk to your doctor about whether to have your thyroid checked as part of a regular physical exam. Women have an increased chance of a thyroid problem. · Osteoporosis. You should begin tests for bone density at age 72. If you are younger than 72, ask your doctor whether you have factors that may increase your risk for this disease. You may want to have this test before age 72. · Heart attack and stroke risk. At least every 4 to 6 years, you should have your risk for heart attack and stroke assessed. Your doctor uses factors such as your age, blood pressure, cholesterol, and whether you smoke or have diabetes to show what your risk for a heart attack or stroke is over the next 10 years. When should you call for help? Watch closely for changes in your health, and be sure to contact your doctor if you have any problems or symptoms that concern you. Where can you learn more? Go to http://bello-alejandro.info/. Enter F565 in the search box to learn more about \"Well Visit, Women 50 to 72: Care Instructions. \" Current as of: March 28, 2018 Content Version: 11.9 © 3569-0739 [employer]. Care instructions adapted under license by [employer] (which disclaims liability or warranty for this information). If you have questions about a medical condition or this instruction, always ask your healthcare professional. [place] [address] any warranty or liability for your use of this information.

## 2019-04-24 NOTE — PROGRESS NOTES
HISTORY OF PRESENT ILLNESS [place] Pedroza is a 66 y.o. female for [facility] Health Maintenance Topic Date Due  Shingrix Vaccine Age 50> (1 of 2) 11/30/2007  PAP AKA CERVICAL CYTOLOGY - Has appt with  in May  05/01/2019  BREAST CANCER SCRN MAMMOGRAM - +SBE, Up to date, completed by gynecologist.   
 05/25/2019  Influenza Age 5 to Adult  08/01/2019  COLONOSCOPY  01/11/2022  DTaP/Tdap/Td series (2 - Td) 05/27/2022  Hepatitis C Screening  Completed  Pneumococcal 0-64 years  Aged Out Hypothyroidism Patient is seen for followup of hypothyroidism. Thyroid ROS: fatigue, +weight changes,  Denies heat/cold intolerance, bowel/skin changes or CVS symptoms. Cardiovascular Review: 
The patient has no known cardiovascular conditions. Body mass index is 28.63 kg/m². in the process of losing weight. Diet and Lifestyle: working on weight reduction through decreased sugar intake and increased exercise. Home BP Monitoring: is not measured at home. Pertinent ROS: no TIA's, no chest pain on exertion, no dyspnea on exertion, no swelling of ankles. Depression Patient is seen for followup of depression. Treatment includes Prozac,  exercise and individual therapy. she denies suicidal thoughts with specific plan. she experiences the following side effects from the treatment: none. 3 most recent PHQ Screens 10/19/2016 Little interest or pleasure in doing things Not at all Feeling down, depressed, irritable, or hopeless Not at all Total Score PHQ 2 0 Review of Systems Gastrointestinal: Positive for heartburn (intermittently followed by GI last seen 1.5 yr ago. ). per [facility] Patient Active Problem List  
 Diagnosis Date Noted  HLD (hyperlipidemia) 06/15/2016  Acquired hypothyroidism 06/15/2016  Reactive depression 06/15/2016  Sinus problem Current Outpatient Medications Medication Sig Dispense Refill  SYNTHROID 88 mcg tablet TAKE 1 TABLET BY MOUTH DAILY BEFORE BREAKFAST 90 Tab 1  
 SYNTHROID 88 mcg tablet TAKE 1 TABLET BY MOUTH DAILY BEFORE BREAKFAST 90 Tab 0  
 omeprazole (PRILOSEC) 20 mg capsule Take 20 mg by mouth daily.  FLUoxetine (PROZAC) 40 mg capsule Take 1 Cap by mouth daily. 90 Cap 3  
 mometasone (NASONEX) 50 mcg/actuation nasal spray 2 sprays daily.  Azelastine (ASTEPRO) 0.15 % (205.5 mcg) nasal spray two (2) times a day.  dicyclomine (BENTYL) 10 mg capsule dicyclomine 10 mg capsule Allergies Allergen Reactions  Codeine Unknown (comments)  Sudafed [Pseudoephedrine Hcl] Palpitations  Sulfa (Sulfonamide Antibiotics) Itching Visit Vitals /66 (BP 1 Location: Right arm, BP Patient Position: Sitting) Pulse 67 Temp 98.2 °F (36.8 °C) (Oral) Resp 16 Ht 5' 6\" (1.676 m) Wt 177 lb 6.4 oz (80.5 kg) SpO2 96% BMI 28.63 kg/m² Physical Exam  
Constitutional: She is oriented to person, place, and time. She appears well-developed and well-nourished. HENT:  
Right Ear: External ear normal.  
Left Ear: External ear normal.  
Mouth/Throat: Oropharynx is clear and moist. No oropharyngeal exudate. Eyes: Conjunctivae are normal. No scleral icterus. Neck: Normal range of motion. Neck supple. No thyromegaly present. Cardiovascular: Normal rate, regular rhythm and normal heart sounds. Exam reveals no gallop and no friction rub. No murmur heard. Pulmonary/Chest: Effort normal and breath sounds normal. No respiratory distress. She has no wheezes. She has no rales. She exhibits no tenderness. Abdominal: Soft. Bowel sounds are normal. She exhibits no distension. There is no tenderness. There is no rebound and no guarding. Genitourinary: Rectal exam shows guaiac negative stool. Genitourinary Comments: Deferred for gyn per pt request  
Musculoskeletal: Normal range of motion. She exhibits no edema or tenderness. Neurological: She is alert and oriented to person, place, and time. ASSESSMENT and PLAN Diagnoses and all orders for this visit: 
 
1. Well woman exam (no gynecological exam)- Health Maintenance reviewed and addressed as ordered below Considering Shingrx. Followed by gynecology.  
-     HEMOGLOBIN A1C WITH EAG 
-     TSH 3RD GENERATION 
-     CBC WITH AUTOMATED DIFF 
-     METABOLIC PANEL, COMPREHENSIVE 
-     LIPID PANEL 2. Acquired hypothyroidism- check TFTs. 6 month rx after evaluation 
-     TSH 3RD GENERATION 
-     T4, FREE 3. Reactive depression- Stable on prozac and with adjunct therapy/ exercise. Patient Education:  Reviewed concept of depression as biochemical imbalance of neurotransmitters and rationale for treatment. Instructed patient to contact office or 911 promptly should condition worsen or any new symptoms appear and provided on-call telephone numbers. IF THE PATIENT HAS ANY SUICIDAL OR HOMICIDAL IDEATION, CALL THE OFFICE, DISCUSS WITH A SUPPORT MEMBER OR GO TO THE ER IMMEDIATELY. Patient was agreeable with this Medication risks/benefits/costs/interactions/alternatives discussed with patient. Iam Layton  was given an after visit summary which includes diagnoses, current medications, & vitals. she expressed understanding with the diagnosis and plan.

## 2019-04-26 LAB
ALBUMIN SERPL-MCNC: 4.2 G/DL (ref 3.6–4.8)
ALBUMIN/GLOB SERPL: 1.8 {RATIO} (ref 1.2–2.2)
ALP SERPL-CCNC: 86 IU/L (ref 39–117)
ALT SERPL-CCNC: 22 IU/L (ref 0–32)
AST SERPL-CCNC: 20 IU/L (ref 0–40)
BASOPHILS # BLD AUTO: 0 X10E3/UL (ref 0–0.2)
BASOPHILS NFR BLD AUTO: 1 %
BILIRUB SERPL-MCNC: 0.5 MG/DL (ref 0–1.2)
BUN SERPL-MCNC: 17 MG/DL (ref 8–27)
BUN/CREAT SERPL: 19 (ref 12–28)
CALCIUM SERPL-MCNC: 9.4 MG/DL (ref 8.7–10.3)
CHLORIDE SERPL-SCNC: 104 MMOL/L (ref 96–106)
CHOLEST SERPL-MCNC: 257 MG/DL (ref 100–199)
CO2 SERPL-SCNC: 24 MMOL/L (ref 20–29)
CREAT SERPL-MCNC: 0.91 MG/DL (ref 0.57–1)
EOSINOPHIL # BLD AUTO: 0 X10E3/UL (ref 0–0.4)
EOSINOPHIL NFR BLD AUTO: 1 %
ERYTHROCYTE [DISTWIDTH] IN BLOOD BY AUTOMATED COUNT: 13.7 % (ref 12.3–15.4)
EST. AVERAGE GLUCOSE BLD GHB EST-MCNC: 123 MG/DL
GLOBULIN SER CALC-MCNC: 2.3 G/DL (ref 1.5–4.5)
GLUCOSE SERPL-MCNC: 111 MG/DL (ref 65–99)
HBA1C MFR BLD: 5.9 % (ref 4.8–5.6)
HCT VFR BLD AUTO: 45.2 % (ref 34–46.6)
HDLC SERPL-MCNC: 57 MG/DL
HGB BLD-MCNC: 14.7 G/DL (ref 11.1–15.9)
IMM GRANULOCYTES # BLD AUTO: 0 X10E3/UL (ref 0–0.1)
IMM GRANULOCYTES NFR BLD AUTO: 0 %
LDLC SERPL CALC-MCNC: 178 MG/DL (ref 0–99)
LYMPHOCYTES # BLD AUTO: 1.1 X10E3/UL (ref 0.7–3.1)
LYMPHOCYTES NFR BLD AUTO: 23 %
MCH RBC QN AUTO: 29.3 PG (ref 26.6–33)
MCHC RBC AUTO-ENTMCNC: 32.5 G/DL (ref 31.5–35.7)
MCV RBC AUTO: 90 FL (ref 79–97)
MONOCYTES # BLD AUTO: 0.4 X10E3/UL (ref 0.1–0.9)
MONOCYTES NFR BLD AUTO: 9 %
NEUTROPHILS # BLD AUTO: 3.3 X10E3/UL (ref 1.4–7)
NEUTROPHILS NFR BLD AUTO: 66 %
PLATELET # BLD AUTO: 240 X10E3/UL (ref 150–379)
POTASSIUM SERPL-SCNC: 4.6 MMOL/L (ref 3.5–5.2)
PROT SERPL-MCNC: 6.5 G/DL (ref 6–8.5)
RBC # BLD AUTO: 5.01 X10E6/UL (ref 3.77–5.28)
SODIUM SERPL-SCNC: 142 MMOL/L (ref 134–144)
T4 FREE SERPL-MCNC: 1.09 NG/DL (ref 0.82–1.77)
TRIGL SERPL-MCNC: 108 MG/DL (ref 0–149)
TSH SERPL DL<=0.005 MIU/L-ACNC: 1.06 UIU/ML (ref 0.45–4.5)
VLDLC SERPL CALC-MCNC: 22 MG/DL (ref 5–40)
WBC # BLD AUTO: 4.9 X10E3/UL (ref 3.4–10.8)

## 2019-04-30 DIAGNOSIS — F32.9 REACTIVE DEPRESSION: ICD-10-CM

## 2019-04-30 RX ORDER — FLUOXETINE HYDROCHLORIDE 40 MG/1
CAPSULE ORAL
Qty: 90 CAP | Refills: 2 | Status: SHIPPED | OUTPATIENT
Start: 2019-04-30 | End: 2019-05-09 | Stop reason: SDUPTHER

## 2019-05-07 ENCOUNTER — TELEPHONE (OUTPATIENT)
Dept: INTERNAL MEDICINE CLINIC | Age: 64
End: 2019-05-07

## 2019-05-07 NOTE — TELEPHONE ENCOUNTER
Patient states she is waiting for Dr. Rajesh Falcon to ok her refill since she had her labs done last week.

## 2019-05-09 DIAGNOSIS — F32.9 REACTIVE DEPRESSION: ICD-10-CM

## 2019-05-09 DIAGNOSIS — E03.9 ACQUIRED HYPOTHYROIDISM: ICD-10-CM

## 2019-05-09 RX ORDER — LEVOTHYROXINE SODIUM 88 UG/1
88 TABLET ORAL
Qty: 90 TAB | Refills: 1 | Status: SHIPPED | OUTPATIENT
Start: 2019-05-09

## 2019-05-09 RX ORDER — FLUOXETINE HYDROCHLORIDE 40 MG/1
CAPSULE ORAL
Qty: 90 CAP | Refills: 2 | Status: SHIPPED | OUTPATIENT
Start: 2019-05-09 | End: 2019-06-06 | Stop reason: DRUGHIGH

## 2019-05-09 NOTE — PROGRESS NOTES
Please let Craig Manuel know her thyroid level is stable. Her medication has been reordered for 6-month supply. Her cholesterol is still high. Fortunately no medication is indicated. She should continue to work on diet and exercise to optimize this.   Please mail letter which has been

## 2019-05-10 ENCOUNTER — TELEPHONE (OUTPATIENT)
Dept: INTERNAL MEDICINE CLINIC | Age: 64
End: 2019-05-10

## 2019-05-10 NOTE — TELEPHONE ENCOUNTER
----- Message from Britt Reeder MD sent at 5/9/2019  4:31 PM EDT -----  Please let Wanda Padron know her thyroid level is stable. Her medication has been reordered for 6-month supply. Her cholesterol is still high. Fortunately no medication is indicated. She should continue to work on diet and exercise to optimize this.   Please mail letter which has been

## 2019-05-10 NOTE — TELEPHONE ENCOUNTER
Informed patient per provider result note. Patient verbalized understanding. Please let Isabelle Garcias know her thyroid level is stable.  Her medication has been reordered for 6-month supply. Eduardo Valdivia cholesterol is still high.  Fortunately no medication is indicated. She should continue to work on diet and exercise to optimize this.  Please mail letter which has been

## 2019-06-04 ENCOUNTER — TELEPHONE (OUTPATIENT)
Dept: INTERNAL MEDICINE CLINIC | Age: 64
End: 2019-06-04

## 2019-06-04 NOTE — TELEPHONE ENCOUNTER
Verified patient identity with two identifiers. Spoke with patient by phone. Currently taking Prozac 40mg every day  Would like to decrease to Prozac 20 every day   Can she do this without coming in and does she have to taper? Will send to Dr. Sabrina Ramachandran for recommendations.

## 2019-06-06 RX ORDER — FLUOXETINE 20 MG/1
20 TABLET ORAL DAILY
Qty: 90 TAB | Refills: 0 | Status: SHIPPED | OUTPATIENT
Start: 2019-06-06

## 2019-06-06 NOTE — TELEPHONE ENCOUNTER
Verified patient identity with two identifiers. Spoke with patient by phone. Informed per Dr. Katerin Soares fine to decrease dose without \"taper\" to Prozac 20mg daily. New rx sent to patient [facility]. Patient instructed to call with any issues. She knows to find new PCP due to Dr. Katerin Soares leaving the are.

## 2019-08-31 DIAGNOSIS — E03.9 ACQUIRED HYPOTHYROIDISM: ICD-10-CM

## 2019-09-04 RX ORDER — LEVOTHYROXINE SODIUM 88 UG/1
TABLET ORAL
Qty: 90 TAB | Refills: 1 | OUTPATIENT
Start: 2019-09-04